# Patient Record
Sex: FEMALE | Race: WHITE | NOT HISPANIC OR LATINO | Employment: OTHER | URBAN - METROPOLITAN AREA
[De-identification: names, ages, dates, MRNs, and addresses within clinical notes are randomized per-mention and may not be internally consistent; named-entity substitution may affect disease eponyms.]

---

## 2025-02-17 ENCOUNTER — APPOINTMENT (OUTPATIENT)
Dept: RADIOLOGY | Facility: HOSPITAL | Age: 85
End: 2025-02-17
Payer: MEDICARE

## 2025-02-17 ENCOUNTER — HOSPITAL ENCOUNTER (OUTPATIENT)
Facility: HOSPITAL | Age: 85
Setting detail: OBSERVATION
Discharge: HOME | End: 2025-02-18
Attending: EMERGENCY MEDICINE | Admitting: INTERNAL MEDICINE
Payer: MEDICARE

## 2025-02-17 DIAGNOSIS — R79.1 SUPRATHERAPEUTIC INR: ICD-10-CM
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
## 2025-02-17 DIAGNOSIS — S22.060D CLOSED WEDGE COMPRESSION FRACTURE OF T8 VERTEBRA WITH ROUTINE HEALING: ICD-10-CM

## 2025-02-17 DIAGNOSIS — R91.1 LUNG NODULE: ICD-10-CM

## 2025-02-17 DIAGNOSIS — R26.2 DIFFICULTY IN WALKING: ICD-10-CM

## 2025-02-17 DIAGNOSIS — S22.060A COMPRESSION FRACTURE OF T8 VERTEBRA, INITIAL ENCOUNTER (MULTI): Primary | ICD-10-CM

## 2025-02-17 DIAGNOSIS — S09.90XA CLOSED HEAD INJURY, INITIAL ENCOUNTER: ICD-10-CM

## 2025-02-17 LAB
ABO GROUP (TYPE) IN BLOOD: NORMAL
ALBUMIN SERPL BCP-MCNC: 4.5 G/DL (ref 3.4–5)
ALP SERPL-CCNC: 73 U/L (ref 33–136)
ALT SERPL W P-5'-P-CCNC: 9 U/L (ref 7–45)
ANION GAP SERPL CALC-SCNC: 13 MMOL/L (ref 10–20)
ANTIBODY SCREEN: NORMAL
AST SERPL W P-5'-P-CCNC: 16 U/L (ref 9–39)
BASOPHILS # BLD AUTO: 0.04 X10*3/UL (ref 0–0.1)
BASOPHILS NFR BLD AUTO: 1.1 %
BILIRUB SERPL-MCNC: 1 MG/DL (ref 0–1.2)
BUN SERPL-MCNC: 14 MG/DL (ref 6–23)
CALCIUM SERPL-MCNC: 9.4 MG/DL (ref 8.6–10.3)
CHLORIDE SERPL-SCNC: 100 MMOL/L (ref 98–107)
CK SERPL-CCNC: 36 U/L (ref 0–215)
CO2 SERPL-SCNC: 29 MMOL/L (ref 21–32)
CREAT SERPL-MCNC: 0.49 MG/DL (ref 0.5–1.05)
EGFRCR SERPLBLD CKD-EPI 2021: >90 ML/MIN/1.73M*2
EOSINOPHIL # BLD AUTO: 0.03 X10*3/UL (ref 0–0.4)
EOSINOPHIL NFR BLD AUTO: 0.8 %
ERYTHROCYTE [DISTWIDTH] IN BLOOD BY AUTOMATED COUNT: 13.9 % (ref 11.5–14.5)
ETHANOL SERPL-MCNC: <10 MG/DL
GLUCOSE SERPL-MCNC: 138 MG/DL (ref 74–99)
HCT VFR BLD AUTO: 41.1 % (ref 36–46)
HGB BLD-MCNC: 13.7 G/DL (ref 12–16)
HOLD SPECIMEN: NORMAL
IMM GRANULOCYTES # BLD AUTO: 0.01 X10*3/UL (ref 0–0.5)
IMM GRANULOCYTES NFR BLD AUTO: 0.3 % (ref 0–0.9)
INR PPP: 5.3 (ref 0.9–1.1)
LACTATE SERPL-SCNC: 1.3 MMOL/L (ref 0.4–2)
LYMPHOCYTES # BLD AUTO: 0.58 X10*3/UL (ref 0.8–3)
LYMPHOCYTES NFR BLD AUTO: 15.7 %
MCH RBC QN AUTO: 33.1 PG (ref 26–34)
MCHC RBC AUTO-ENTMCNC: 33.3 G/DL (ref 32–36)
MCV RBC AUTO: 99 FL (ref 80–100)
MONOCYTES # BLD AUTO: 0.24 X10*3/UL (ref 0.05–0.8)
MONOCYTES NFR BLD AUTO: 6.5 %
NEUTROPHILS # BLD AUTO: 2.79 X10*3/UL (ref 1.6–5.5)
NEUTROPHILS NFR BLD AUTO: 75.6 %
NRBC BLD-RTO: 0 /100 WBCS (ref 0–0)
PLATELET # BLD AUTO: 69 X10*3/UL (ref 150–450)
POTASSIUM SERPL-SCNC: 3.9 MMOL/L (ref 3.5–5.3)
PROT SERPL-MCNC: 6.7 G/DL (ref 6.4–8.2)
PROTHROMBIN TIME: 61.4 SECONDS (ref 9.8–12.8)
RBC # BLD AUTO: 4.14 X10*6/UL (ref 4–5.2)
RH FACTOR (ANTIGEN D): NORMAL
SODIUM SERPL-SCNC: 138 MMOL/L (ref 136–145)
WBC # BLD AUTO: 3.7 X10*3/UL (ref 4.4–11.3)

## 2025-02-17 PROCEDURE — 71045 X-RAY EXAM CHEST 1 VIEW: CPT | Mod: FOREIGN READ | Performed by: RADIOLOGY

## 2025-02-17 PROCEDURE — 72128 CT CHEST SPINE W/O DYE: CPT | Mod: RCN

## 2025-02-17 PROCEDURE — 99285 EMERGENCY DEPT VISIT HI MDM: CPT | Mod: 25 | Performed by: EMERGENCY MEDICINE

## 2025-02-17 PROCEDURE — 99223 1ST HOSP IP/OBS HIGH 75: CPT | Performed by: INTERNAL MEDICINE

## 2025-02-17 PROCEDURE — 36415 COLL VENOUS BLD VENIPUNCTURE: CPT | Performed by: EMERGENCY MEDICINE

## 2025-02-17 PROCEDURE — 2500000001 HC RX 250 WO HCPCS SELF ADMINISTERED DRUGS (ALT 637 FOR MEDICARE OP): Performed by: EMERGENCY MEDICINE

## 2025-02-17 PROCEDURE — 72170 X-RAY EXAM OF PELVIS: CPT | Mod: FOREIGN READ | Performed by: RADIOLOGY

## 2025-02-17 PROCEDURE — 83605 ASSAY OF LACTIC ACID: CPT | Performed by: EMERGENCY MEDICINE

## 2025-02-17 PROCEDURE — 96375 TX/PRO/DX INJ NEW DRUG ADDON: CPT

## 2025-02-17 PROCEDURE — 85610 PROTHROMBIN TIME: CPT | Performed by: EMERGENCY MEDICINE

## 2025-02-17 PROCEDURE — G0390 TRAUMA RESPONS W/HOSP CRITI: HCPCS

## 2025-02-17 PROCEDURE — 70450 CT HEAD/BRAIN W/O DYE: CPT

## 2025-02-17 PROCEDURE — 85025 COMPLETE CBC W/AUTO DIFF WBC: CPT | Performed by: EMERGENCY MEDICINE

## 2025-02-17 PROCEDURE — 2500000004 HC RX 250 GENERAL PHARMACY W/ HCPCS (ALT 636 FOR OP/ED): Performed by: EMERGENCY MEDICINE

## 2025-02-17 PROCEDURE — 83036 HEMOGLOBIN GLYCOSYLATED A1C: CPT | Mod: ELYLAB | Performed by: INTERNAL MEDICINE

## 2025-02-17 PROCEDURE — 72170 X-RAY EXAM OF PELVIS: CPT

## 2025-02-17 PROCEDURE — 71045 X-RAY EXAM CHEST 1 VIEW: CPT

## 2025-02-17 PROCEDURE — 72131 CT LUMBAR SPINE W/O DYE: CPT | Mod: RCN

## 2025-02-17 PROCEDURE — 82077 ASSAY SPEC XCP UR&BREATH IA: CPT | Performed by: EMERGENCY MEDICINE

## 2025-02-17 PROCEDURE — 84075 ASSAY ALKALINE PHOSPHATASE: CPT | Performed by: EMERGENCY MEDICINE

## 2025-02-17 PROCEDURE — 82550 ASSAY OF CK (CPK): CPT | Performed by: EMERGENCY MEDICINE

## 2025-02-17 PROCEDURE — G0378 HOSPITAL OBSERVATION PER HR: HCPCS

## 2025-02-17 PROCEDURE — 72125 CT NECK SPINE W/O DYE: CPT

## 2025-02-17 PROCEDURE — 96374 THER/PROPH/DIAG INJ IV PUSH: CPT

## 2025-02-17 PROCEDURE — 71250 CT THORAX DX C-: CPT

## 2025-02-17 PROCEDURE — 86901 BLOOD TYPING SEROLOGIC RH(D): CPT | Performed by: EMERGENCY MEDICINE

## 2025-02-17 RX ORDER — ACETAMINOPHEN 500 MG
1000 TABLET ORAL EVERY 8 HOURS PRN
Qty: 30 TABLET | Refills: 0 | Status: SHIPPED | OUTPATIENT
Start: 2025-02-17 | End: 2025-02-18 | Stop reason: HOSPADM

## 2025-02-17 RX ORDER — LIDOCAINE 560 MG/1
1 PATCH PERCUTANEOUS; TOPICAL; TRANSDERMAL DAILY
Status: DISCONTINUED | OUTPATIENT
Start: 2025-02-18 | End: 2025-02-18 | Stop reason: HOSPADM

## 2025-02-17 RX ORDER — LIDOCAINE 50 MG/G
1 PATCH TOPICAL DAILY
Qty: 5 PATCH | Refills: 0 | Status: SHIPPED | OUTPATIENT
Start: 2025-02-17 | End: 2025-02-18

## 2025-02-17 RX ORDER — FENTANYL CITRATE 50 UG/ML
25 INJECTION, SOLUTION INTRAMUSCULAR; INTRAVENOUS ONCE
Status: COMPLETED | OUTPATIENT
Start: 2025-02-17 | End: 2025-02-17

## 2025-02-17 RX ORDER — ONDANSETRON HYDROCHLORIDE 2 MG/ML
4 INJECTION, SOLUTION INTRAVENOUS ONCE
Status: COMPLETED | OUTPATIENT
Start: 2025-02-17 | End: 2025-02-17

## 2025-02-17 RX ORDER — ACETAMINOPHEN 325 MG/1
1000 TABLET ORAL ONCE
Status: COMPLETED | OUTPATIENT
Start: 2025-02-17 | End: 2025-02-17

## 2025-02-17 RX ADMIN — ACETAMINOPHEN 975 MG: 325 TABLET ORAL at 21:44

## 2025-02-17 RX ADMIN — ONDANSETRON 4 MG: 2 INJECTION INTRAMUSCULAR; INTRAVENOUS at 19:49

## 2025-02-17 RX ADMIN — FENTANYL CITRATE 25 MCG: 50 INJECTION INTRAMUSCULAR; INTRAVENOUS at 19:50

## 2025-02-17 ASSESSMENT — LIFESTYLE VARIABLES
EVER HAD A DRINK FIRST THING IN THE MORNING TO STEADY YOUR NERVES TO GET RID OF A HANGOVER: NO
TOTAL SCORE: 0
EVER FELT BAD OR GUILTY ABOUT YOUR DRINKING: NO
HAVE YOU EVER FELT YOU SHOULD CUT DOWN ON YOUR DRINKING: NO
HAVE PEOPLE ANNOYED YOU BY CRITICIZING YOUR DRINKING: NO

## 2025-02-17 ASSESSMENT — PAIN - FUNCTIONAL ASSESSMENT: PAIN_FUNCTIONAL_ASSESSMENT: 0-10

## 2025-02-17 ASSESSMENT — PAIN SCALES - GENERAL: PAINLEVEL_OUTOF10: 9

## 2025-02-18 VITALS
BODY MASS INDEX: 19.42 KG/M2 | OXYGEN SATURATION: 92 % | DIASTOLIC BLOOD PRESSURE: 57 MMHG | WEIGHT: 113.76 LBS | RESPIRATION RATE: 18 BRPM | HEART RATE: 83 BPM | SYSTOLIC BLOOD PRESSURE: 110 MMHG | TEMPERATURE: 98.6 F | HEIGHT: 64 IN

## 2025-02-18 PROBLEM — R79.1 SUPRATHERAPEUTIC INR: Status: ACTIVE | Noted: 2025-02-18

## 2025-02-18 PROBLEM — S22.060D CLOSED WEDGE COMPRESSION FRACTURE OF T8 VERTEBRA WITH ROUTINE HEALING: Status: ACTIVE | Noted: 2025-02-18

## 2025-02-18 PROBLEM — R73.9 HYPERGLYCEMIA: Status: ACTIVE | Noted: 2025-02-18

## 2025-02-18 PROBLEM — J44.9 COPD (CHRONIC OBSTRUCTIVE PULMONARY DISEASE) (MULTI): Status: ACTIVE | Noted: 2025-02-18

## 2025-02-18 PROBLEM — F10.90 ALCOHOL USE DISORDER: Status: ACTIVE | Noted: 2025-02-18

## 2025-02-18 PROBLEM — I48.20 CHRONIC ATRIAL FIBRILLATION (MULTI): Status: ACTIVE | Noted: 2025-02-18

## 2025-02-18 PROBLEM — G31.84 MILD COGNITIVE IMPAIRMENT: Status: ACTIVE | Noted: 2025-02-18

## 2025-02-18 PROBLEM — D69.6 THROMBOCYTOPENIA (CMS-HCC): Status: ACTIVE | Noted: 2025-02-18

## 2025-02-18 LAB
ALBUMIN SERPL BCP-MCNC: 3.9 G/DL (ref 3.4–5)
ALP SERPL-CCNC: 63 U/L (ref 33–136)
ALT SERPL W P-5'-P-CCNC: 8 U/L (ref 7–45)
ANION GAP SERPL CALC-SCNC: 10 MMOL/L (ref 10–20)
APPEARANCE UR: CLEAR
AST SERPL W P-5'-P-CCNC: 14 U/L (ref 9–39)
BACTERIA #/AREA URNS AUTO: ABNORMAL /HPF
BASOPHILS # BLD AUTO: 0.02 X10*3/UL (ref 0–0.1)
BASOPHILS NFR BLD AUTO: 0.7 %
BILIRUB SERPL-MCNC: 0.9 MG/DL (ref 0–1.2)
BILIRUB UR STRIP.AUTO-MCNC: NEGATIVE MG/DL
BUN SERPL-MCNC: 13 MG/DL (ref 6–23)
CALCIUM SERPL-MCNC: 8.9 MG/DL (ref 8.6–10.3)
CHLORIDE SERPL-SCNC: 101 MMOL/L (ref 98–107)
CO2 SERPL-SCNC: 30 MMOL/L (ref 21–32)
COLOR UR: YELLOW
CREAT SERPL-MCNC: 0.38 MG/DL (ref 0.5–1.05)
EGFRCR SERPLBLD CKD-EPI 2021: >90 ML/MIN/1.73M*2
EOSINOPHIL # BLD AUTO: 0.05 X10*3/UL (ref 0–0.4)
EOSINOPHIL NFR BLD AUTO: 1.8 %
ERYTHROCYTE [DISTWIDTH] IN BLOOD BY AUTOMATED COUNT: 13.9 % (ref 11.5–14.5)
EST. AVERAGE GLUCOSE BLD GHB EST-MCNC: 108 MG/DL
GLUCOSE SERPL-MCNC: 90 MG/DL (ref 74–99)
GLUCOSE UR STRIP.AUTO-MCNC: NORMAL MG/DL
HBA1C MFR BLD: 5.4 %
HCT VFR BLD AUTO: 37.5 % (ref 36–46)
HGB BLD-MCNC: 12.4 G/DL (ref 12–16)
HOLD SPECIMEN: NORMAL
HOLD SPECIMEN: NORMAL
IMM GRANULOCYTES # BLD AUTO: 0.01 X10*3/UL (ref 0–0.5)
IMM GRANULOCYTES NFR BLD AUTO: 0.4 % (ref 0–0.9)
INR PPP: 4.7 (ref 0.9–1.1)
KETONES UR STRIP.AUTO-MCNC: NEGATIVE MG/DL
LEUKOCYTE ESTERASE UR QL STRIP.AUTO: NEGATIVE
LYMPHOCYTES # BLD AUTO: 0.72 X10*3/UL (ref 0.8–3)
LYMPHOCYTES NFR BLD AUTO: 25.8 %
MAGNESIUM SERPL-MCNC: 1.6 MG/DL (ref 1.6–2.4)
MCH RBC QN AUTO: 32.8 PG (ref 26–34)
MCHC RBC AUTO-ENTMCNC: 33.1 G/DL (ref 32–36)
MCV RBC AUTO: 99 FL (ref 80–100)
MONOCYTES # BLD AUTO: 0.27 X10*3/UL (ref 0.05–0.8)
MONOCYTES NFR BLD AUTO: 9.7 %
MUCOUS THREADS #/AREA URNS AUTO: ABNORMAL /LPF
NEUTROPHILS # BLD AUTO: 1.72 X10*3/UL (ref 1.6–5.5)
NEUTROPHILS NFR BLD AUTO: 61.6 %
NITRITE UR QL STRIP.AUTO: NEGATIVE
NRBC BLD-RTO: 0 /100 WBCS (ref 0–0)
PH UR STRIP.AUTO: 6.5 [PH]
PLATELET # BLD AUTO: 56 X10*3/UL (ref 150–450)
POTASSIUM SERPL-SCNC: 3.8 MMOL/L (ref 3.5–5.3)
PROT SERPL-MCNC: 5.7 G/DL (ref 6.4–8.2)
PROT UR STRIP.AUTO-MCNC: ABNORMAL MG/DL
PROTHROMBIN TIME: 54.4 SECONDS (ref 9.8–12.8)
RBC # BLD AUTO: 3.78 X10*6/UL (ref 4–5.2)
RBC # UR STRIP.AUTO: ABNORMAL MG/DL
RBC #/AREA URNS AUTO: ABNORMAL /HPF
SODIUM SERPL-SCNC: 137 MMOL/L (ref 136–145)
SP GR UR STRIP.AUTO: 1.02
UROBILINOGEN UR STRIP.AUTO-MCNC: NORMAL MG/DL
WBC # BLD AUTO: 2.8 X10*3/UL (ref 4.4–11.3)
WBC #/AREA URNS AUTO: ABNORMAL /HPF

## 2025-02-18 PROCEDURE — G0378 HOSPITAL OBSERVATION PER HR: HCPCS

## 2025-02-18 PROCEDURE — 85025 COMPLETE CBC W/AUTO DIFF WBC: CPT | Performed by: INTERNAL MEDICINE

## 2025-02-18 PROCEDURE — 97161 PT EVAL LOW COMPLEX 20 MIN: CPT | Mod: GP | Performed by: PHYSICAL THERAPIST

## 2025-02-18 PROCEDURE — 97165 OT EVAL LOW COMPLEX 30 MIN: CPT | Mod: GO

## 2025-02-18 PROCEDURE — 2500000004 HC RX 250 GENERAL PHARMACY W/ HCPCS (ALT 636 FOR OP/ED): Performed by: INTERNAL MEDICINE

## 2025-02-18 PROCEDURE — 2500000001 HC RX 250 WO HCPCS SELF ADMINISTERED DRUGS (ALT 637 FOR MEDICARE OP): Performed by: INTERNAL MEDICINE

## 2025-02-18 PROCEDURE — 99239 HOSP IP/OBS DSCHRG MGMT >30: CPT | Performed by: INTERNAL MEDICINE

## 2025-02-18 PROCEDURE — 36415 COLL VENOUS BLD VENIPUNCTURE: CPT | Performed by: INTERNAL MEDICINE

## 2025-02-18 PROCEDURE — 85610 PROTHROMBIN TIME: CPT | Performed by: INTERNAL MEDICINE

## 2025-02-18 PROCEDURE — 83735 ASSAY OF MAGNESIUM: CPT | Performed by: INTERNAL MEDICINE

## 2025-02-18 PROCEDURE — 81001 URINALYSIS AUTO W/SCOPE: CPT | Performed by: EMERGENCY MEDICINE

## 2025-02-18 PROCEDURE — 80053 COMPREHEN METABOLIC PANEL: CPT | Performed by: INTERNAL MEDICINE

## 2025-02-18 PROCEDURE — 2500000005 HC RX 250 GENERAL PHARMACY W/O HCPCS: Performed by: INTERNAL MEDICINE

## 2025-02-18 RX ORDER — DILTIAZEM HYDROCHLORIDE 180 MG/1
180 CAPSULE, EXTENDED RELEASE ORAL DAILY
COMMUNITY
Start: 2024-05-03

## 2025-02-18 RX ORDER — OXYCODONE HYDROCHLORIDE 5 MG/1
5 TABLET ORAL EVERY 6 HOURS PRN
Status: DISCONTINUED | OUTPATIENT
Start: 2025-02-18 | End: 2025-02-18 | Stop reason: HOSPADM

## 2025-02-18 RX ORDER — NITROFURANTOIN 25; 75 MG/1; MG/1
1 CAPSULE ORAL
Status: ON HOLD | COMMUNITY
Start: 2024-09-26 | End: 2025-02-18 | Stop reason: ALTCHOICE

## 2025-02-18 RX ORDER — FUROSEMIDE 20 MG/1
20 TABLET ORAL
COMMUNITY
Start: 2025-01-22

## 2025-02-18 RX ORDER — LORAZEPAM 1 MG/1
2 TABLET ORAL EVERY 2 HOUR PRN
Status: DISCONTINUED | OUTPATIENT
Start: 2025-02-18 | End: 2025-02-18 | Stop reason: HOSPADM

## 2025-02-18 RX ORDER — MULTIVIT-MIN/IRON FUM/FOLIC AC 7.5 MG-4
1 TABLET ORAL DAILY
Status: DISCONTINUED | OUTPATIENT
Start: 2025-02-18 | End: 2025-02-18 | Stop reason: HOSPADM

## 2025-02-18 RX ORDER — CITALOPRAM 20 MG/1
40 TABLET, FILM COATED ORAL NIGHTLY
COMMUNITY
Start: 2024-09-03

## 2025-02-18 RX ORDER — LANOLIN ALCOHOL/MO/W.PET/CERES
100 CREAM (GRAM) TOPICAL DAILY
Status: DISCONTINUED | OUTPATIENT
Start: 2025-02-18 | End: 2025-02-18 | Stop reason: HOSPADM

## 2025-02-18 RX ORDER — ONDANSETRON HYDROCHLORIDE 2 MG/ML
4 INJECTION, SOLUTION INTRAVENOUS EVERY 8 HOURS PRN
Status: DISCONTINUED | OUTPATIENT
Start: 2025-02-18 | End: 2025-02-18 | Stop reason: HOSPADM

## 2025-02-18 RX ORDER — WARFARIN 2.5 MG/1
TABLET ORAL
COMMUNITY
Start: 2024-12-28

## 2025-02-18 RX ORDER — LORAZEPAM 1 MG/1
1 TABLET ORAL EVERY 2 HOUR PRN
Status: DISCONTINUED | OUTPATIENT
Start: 2025-02-18 | End: 2025-02-18 | Stop reason: HOSPADM

## 2025-02-18 RX ORDER — ACETAMINOPHEN 160 MG/5ML
650 SOLUTION ORAL EVERY 4 HOURS PRN
Status: DISCONTINUED | OUTPATIENT
Start: 2025-02-18 | End: 2025-02-18 | Stop reason: HOSPADM

## 2025-02-18 RX ORDER — METOPROLOL TARTRATE 50 MG/1
50 TABLET ORAL 2 TIMES DAILY
Status: DISCONTINUED | OUTPATIENT
Start: 2025-02-18 | End: 2025-02-18 | Stop reason: HOSPADM

## 2025-02-18 RX ORDER — ACETAMINOPHEN 500 MG
1000 TABLET ORAL 2 TIMES DAILY
Status: ON HOLD | COMMUNITY
End: 2025-02-18

## 2025-02-18 RX ORDER — FOLIC ACID 1 MG/1
1 TABLET ORAL DAILY
Status: DISCONTINUED | OUTPATIENT
Start: 2025-02-18 | End: 2025-02-18 | Stop reason: HOSPADM

## 2025-02-18 RX ORDER — ACETAMINOPHEN 500 MG
1000 TABLET ORAL EVERY 6 HOURS PRN
Qty: 30 TABLET | Refills: 0 | Status: SHIPPED | OUTPATIENT
Start: 2025-02-18

## 2025-02-18 RX ORDER — OXYCODONE HYDROCHLORIDE 5 MG/1
5 TABLET ORAL EVERY 6 HOURS PRN
Qty: 15 TABLET | Refills: 0 | Status: SHIPPED | OUTPATIENT
Start: 2025-02-18

## 2025-02-18 RX ORDER — METOPROLOL TARTRATE 50 MG/1
50 TABLET ORAL 2 TIMES DAILY
COMMUNITY

## 2025-02-18 RX ORDER — POLYETHYLENE GLYCOL 3350 17 G/17G
17 POWDER, FOR SOLUTION ORAL DAILY PRN
Status: DISCONTINUED | OUTPATIENT
Start: 2025-02-18 | End: 2025-02-18 | Stop reason: HOSPADM

## 2025-02-18 RX ORDER — ACETAMINOPHEN 325 MG/1
650 TABLET ORAL EVERY 4 HOURS PRN
Status: DISCONTINUED | OUTPATIENT
Start: 2025-02-18 | End: 2025-02-18 | Stop reason: HOSPADM

## 2025-02-18 RX ORDER — ENALAPRIL MALEATE 5 MG/1
5 TABLET ORAL NIGHTLY
COMMUNITY

## 2025-02-18 RX ORDER — ACETAMINOPHEN 650 MG/1
650 SUPPOSITORY RECTAL EVERY 4 HOURS PRN
Status: DISCONTINUED | OUTPATIENT
Start: 2025-02-18 | End: 2025-02-18 | Stop reason: HOSPADM

## 2025-02-18 RX ORDER — IPRATROPIUM BROMIDE AND ALBUTEROL SULFATE 2.5; .5 MG/3ML; MG/3ML
3 SOLUTION RESPIRATORY (INHALATION) EVERY 4 HOURS PRN
Status: DISCONTINUED | OUTPATIENT
Start: 2025-02-18 | End: 2025-02-18 | Stop reason: HOSPADM

## 2025-02-18 RX ORDER — BUPROPION HYDROCHLORIDE 150 MG/1
150 TABLET ORAL
COMMUNITY
Start: 2024-09-03

## 2025-02-18 RX ORDER — ONDANSETRON 4 MG/1
4 TABLET, ORALLY DISINTEGRATING ORAL EVERY 8 HOURS PRN
Status: DISCONTINUED | OUTPATIENT
Start: 2025-02-18 | End: 2025-02-18 | Stop reason: HOSPADM

## 2025-02-18 RX ORDER — LIDOCAINE 50 MG/G
1 PATCH TOPICAL DAILY
Qty: 5 PATCH | Refills: 0 | Status: SHIPPED | OUTPATIENT
Start: 2025-02-18

## 2025-02-18 RX ORDER — DILTIAZEM HYDROCHLORIDE 180 MG/1
180 CAPSULE, COATED, EXTENDED RELEASE ORAL DAILY
Status: DISCONTINUED | OUTPATIENT
Start: 2025-02-18 | End: 2025-02-18 | Stop reason: HOSPADM

## 2025-02-18 RX ORDER — FOLIC ACID 1 MG/1
1 TABLET ORAL NIGHTLY
COMMUNITY

## 2025-02-18 RX ORDER — LORAZEPAM 0.5 MG/1
0.5 TABLET ORAL EVERY 2 HOUR PRN
Status: DISCONTINUED | OUTPATIENT
Start: 2025-02-18 | End: 2025-02-18 | Stop reason: HOSPADM

## 2025-02-18 RX ORDER — ALBUTEROL SULFATE 90 UG/1
2 INHALANT RESPIRATORY (INHALATION) EVERY 4 HOURS PRN
COMMUNITY
Start: 2024-05-03

## 2025-02-18 RX ADMIN — METOPROLOL TARTRATE 50 MG: 50 TABLET, FILM COATED ORAL at 09:52

## 2025-02-18 RX ADMIN — METOPROLOL TARTRATE 50 MG: 50 TABLET, FILM COATED ORAL at 02:56

## 2025-02-18 RX ADMIN — ONDANSETRON 4 MG: 4 TABLET, ORALLY DISINTEGRATING ORAL at 15:49

## 2025-02-18 RX ADMIN — LIDOCAINE 4% 1 PATCH: 40 PATCH TOPICAL at 09:52

## 2025-02-18 RX ADMIN — DILTIAZEM HYDROCHLORIDE 180 MG: 180 CAPSULE, COATED, EXTENDED RELEASE ORAL at 09:52

## 2025-02-18 RX ADMIN — OXYCODONE 5 MG: 5 TABLET ORAL at 09:52

## 2025-02-18 RX ADMIN — Medication 100 MG: at 02:56

## 2025-02-18 RX ADMIN — Medication 1 TABLET: at 09:52

## 2025-02-18 RX ADMIN — FOLIC ACID 1 MG: 1 TABLET ORAL at 09:52

## 2025-02-18 RX ADMIN — ACETAMINOPHEN 650 MG: 325 TABLET ORAL at 17:27

## 2025-02-18 SDOH — ECONOMIC STABILITY: INCOME INSECURITY: IN THE PAST 12 MONTHS HAS THE ELECTRIC, GAS, OIL, OR WATER COMPANY THREATENED TO SHUT OFF SERVICES IN YOUR HOME?: NO

## 2025-02-18 SDOH — ECONOMIC STABILITY: FOOD INSECURITY: HOW HARD IS IT FOR YOU TO PAY FOR THE VERY BASICS LIKE FOOD, HOUSING, MEDICAL CARE, AND HEATING?: SOMEWHAT HARD

## 2025-02-18 SDOH — SOCIAL STABILITY: SOCIAL INSECURITY
WITHIN THE LAST YEAR, HAVE YOU BEEN KICKED, HIT, SLAPPED, OR OTHERWISE PHYSICALLY HURT BY YOUR PARTNER OR EX-PARTNER?: NO

## 2025-02-18 SDOH — ECONOMIC STABILITY: TRANSPORTATION INSECURITY: IN THE PAST 12 MONTHS, HAS LACK OF TRANSPORTATION KEPT YOU FROM MEDICAL APPOINTMENTS OR FROM GETTING MEDICATIONS?: NO

## 2025-02-18 SDOH — ECONOMIC STABILITY: HOUSING INSECURITY: AT ANY TIME IN THE PAST 12 MONTHS, WERE YOU HOMELESS OR LIVING IN A SHELTER (INCLUDING NOW)?: NO

## 2025-02-18 SDOH — SOCIAL STABILITY: SOCIAL INSECURITY: WERE YOU ABLE TO COMPLETE ALL THE BEHAVIORAL HEALTH SCREENINGS?: YES

## 2025-02-18 SDOH — SOCIAL STABILITY: SOCIAL INSECURITY: WITHIN THE LAST YEAR, HAVE YOU BEEN HUMILIATED OR EMOTIONALLY ABUSED IN OTHER WAYS BY YOUR PARTNER OR EX-PARTNER?: NO

## 2025-02-18 SDOH — ECONOMIC STABILITY: FOOD INSECURITY: WITHIN THE PAST 12 MONTHS, THE FOOD YOU BOUGHT JUST DIDN'T LAST AND YOU DIDN'T HAVE MONEY TO GET MORE.: NEVER TRUE

## 2025-02-18 SDOH — ECONOMIC STABILITY: FOOD INSECURITY: WITHIN THE PAST 12 MONTHS, YOU WORRIED THAT YOUR FOOD WOULD RUN OUT BEFORE YOU GOT THE MONEY TO BUY MORE.: NEVER TRUE

## 2025-02-18 SDOH — SOCIAL STABILITY: SOCIAL INSECURITY: DO YOU FEEL ANYONE HAS EXPLOITED OR TAKEN ADVANTAGE OF YOU FINANCIALLY OR OF YOUR PERSONAL PROPERTY?: NO

## 2025-02-18 SDOH — SOCIAL STABILITY: SOCIAL INSECURITY: DO YOU FEEL UNSAFE GOING BACK TO THE PLACE WHERE YOU ARE LIVING?: NO

## 2025-02-18 SDOH — SOCIAL STABILITY: SOCIAL INSECURITY: ABUSE: ADULT

## 2025-02-18 SDOH — ECONOMIC STABILITY: HOUSING INSECURITY: IN THE LAST 12 MONTHS, WAS THERE A TIME WHEN YOU WERE NOT ABLE TO PAY THE MORTGAGE OR RENT ON TIME?: NO

## 2025-02-18 SDOH — SOCIAL STABILITY: SOCIAL INSECURITY: WITHIN THE LAST YEAR, HAVE YOU BEEN AFRAID OF YOUR PARTNER OR EX-PARTNER?: NO

## 2025-02-18 SDOH — SOCIAL STABILITY: SOCIAL INSECURITY: DOES ANYONE TRY TO KEEP YOU FROM HAVING/CONTACTING OTHER FRIENDS OR DOING THINGS OUTSIDE YOUR HOME?: NO

## 2025-02-18 SDOH — SOCIAL STABILITY: SOCIAL INSECURITY: HAVE YOU HAD ANY THOUGHTS OF HARMING ANYONE ELSE?: NO

## 2025-02-18 SDOH — SOCIAL STABILITY: SOCIAL INSECURITY
WITHIN THE LAST YEAR, HAVE YOU BEEN RAPED OR FORCED TO HAVE ANY KIND OF SEXUAL ACTIVITY BY YOUR PARTNER OR EX-PARTNER?: NO

## 2025-02-18 SDOH — ECONOMIC STABILITY: HOUSING INSECURITY: IN THE PAST 12 MONTHS, HOW MANY TIMES HAVE YOU MOVED WHERE YOU WERE LIVING?: 0

## 2025-02-18 SDOH — SOCIAL STABILITY: SOCIAL INSECURITY: ARE YOU OR HAVE YOU BEEN THREATENED OR ABUSED PHYSICALLY, EMOTIONALLY, OR SEXUALLY BY ANYONE?: NO

## 2025-02-18 SDOH — SOCIAL STABILITY: SOCIAL INSECURITY: HAS ANYONE EVER THREATENED TO HURT YOUR FAMILY OR YOUR PETS?: NO

## 2025-02-18 SDOH — SOCIAL STABILITY: SOCIAL INSECURITY: ARE THERE ANY APPARENT SIGNS OF INJURIES/BEHAVIORS THAT COULD BE RELATED TO ABUSE/NEGLECT?: NO

## 2025-02-18 SDOH — SOCIAL STABILITY: SOCIAL INSECURITY: HAVE YOU HAD THOUGHTS OF HARMING ANYONE ELSE?: NO

## 2025-02-18 ASSESSMENT — LIFESTYLE VARIABLES
TOTAL SCORE: 0
TOTAL SCORE: 0
PAROXYSMAL SWEATS: NO SWEAT VISIBLE
HEADACHE, FULLNESS IN HEAD: NOT PRESENT
NAUSEA AND VOMITING: NO NAUSEA AND NO VOMITING
ANXIETY: NO ANXIETY, AT EASE
VISUAL DISTURBANCES: NOT PRESENT
AGITATION: NORMAL ACTIVITY
VISUAL DISTURBANCES: NOT PRESENT
PAROXYSMAL SWEATS: NO SWEAT VISIBLE
AUDITORY DISTURBANCES: NOT PRESENT
VISUAL DISTURBANCES: NOT PRESENT
ORIENTATION AND CLOUDING OF SENSORIUM: ORIENTED AND CAN DO SERIAL ADDITIONS
ORIENTATION AND CLOUDING OF SENSORIUM: ORIENTED AND CAN DO SERIAL ADDITIONS
AGITATION: NORMAL ACTIVITY
BLOOD PRESSURE: 148/83
HEADACHE, FULLNESS IN HEAD: NOT PRESENT
NAUSEA AND VOMITING: NO NAUSEA AND NO VOMITING
BLOOD PRESSURE: 116/75
TOTAL SCORE: 0
AUDITORY DISTURBANCES: NOT PRESENT
TOTAL SCORE: 0
ANXIETY: NO ANXIETY, AT EASE
AUDITORY DISTURBANCES: NOT PRESENT
ORIENTATION AND CLOUDING OF SENSORIUM: ORIENTED AND CAN DO SERIAL ADDITIONS
HEADACHE, FULLNESS IN HEAD: NOT PRESENT
ANXIETY: NO ANXIETY, AT EASE
SKIP TO QUESTIONS 9-10: 1
PAROXYSMAL SWEATS: NO SWEAT VISIBLE
AUDITORY DISTURBANCES: NOT PRESENT
TREMOR: NO TREMOR
PULSE: 92
ORIENTATION AND CLOUDING OF SENSORIUM: ORIENTED AND CAN DO SERIAL ADDITIONS
PAROXYSMAL SWEATS: NO SWEAT VISIBLE
AUDIT-C TOTAL SCORE: 4
HOW OFTEN DO YOU HAVE 6 OR MORE DRINKS ON ONE OCCASION: NEVER
BLOOD PRESSURE: 127/80
NAUSEA AND VOMITING: NO NAUSEA AND NO VOMITING
TOTAL SCORE: 0
VISUAL DISTURBANCES: NOT PRESENT
HEADACHE, FULLNESS IN HEAD: NOT PRESENT
TOTAL SCORE: 0
PULSE: 88
NAUSEA AND VOMITING: NO NAUSEA AND NO VOMITING
TREMOR: NO TREMOR
AUDITORY DISTURBANCES: NOT PRESENT
HOW MANY STANDARD DRINKS CONTAINING ALCOHOL DO YOU HAVE ON A TYPICAL DAY: 1 OR 2
VISUAL DISTURBANCES: NOT PRESENT
HOW OFTEN DO YOU HAVE A DRINK CONTAINING ALCOHOL: 4 OR MORE TIMES A WEEK
NAUSEA AND VOMITING: NO NAUSEA AND NO VOMITING
PULSE: 74
AGITATION: NORMAL ACTIVITY
VISUAL DISTURBANCES: NOT PRESENT
PAROXYSMAL SWEATS: NO SWEAT VISIBLE
AUDIT-C TOTAL SCORE: 4
HEADACHE, FULLNESS IN HEAD: NOT PRESENT
TREMOR: NO TREMOR
TOTAL SCORE: 0
AGITATION: NORMAL ACTIVITY
VISUAL DISTURBANCES: NOT PRESENT
ANXIETY: NO ANXIETY, AT EASE
AUDITORY DISTURBANCES: NOT PRESENT
ANXIETY: NO ANXIETY, AT EASE
TREMOR: NO TREMOR
NAUSEA AND VOMITING: NO NAUSEA AND NO VOMITING
AUDITORY DISTURBANCES: NOT PRESENT
ORIENTATION AND CLOUDING OF SENSORIUM: ORIENTED AND CAN DO SERIAL ADDITIONS
NAUSEA AND VOMITING: NO NAUSEA AND NO VOMITING
AGITATION: NORMAL ACTIVITY
AGITATION: NORMAL ACTIVITY
TREMOR: NO TREMOR
ORIENTATION AND CLOUDING OF SENSORIUM: ORIENTED AND CAN DO SERIAL ADDITIONS
PAROXYSMAL SWEATS: NO SWEAT VISIBLE
ANXIETY: NO ANXIETY, AT EASE
TREMOR: NO TREMOR
HEADACHE, FULLNESS IN HEAD: NOT PRESENT
ORIENTATION AND CLOUDING OF SENSORIUM: ORIENTED AND CAN DO SERIAL ADDITIONS
TREMOR: NO TREMOR
HEADACHE, FULLNESS IN HEAD: NOT PRESENT
PULSE: 92
ANXIETY: NO ANXIETY, AT EASE
PAROXYSMAL SWEATS: NO SWEAT VISIBLE
PULSE: 99
AGITATION: NORMAL ACTIVITY

## 2025-02-18 ASSESSMENT — COGNITIVE AND FUNCTIONAL STATUS - GENERAL
WALKING IN HOSPITAL ROOM: A LITTLE
DRESSING REGULAR LOWER BODY CLOTHING: A LITTLE
HELP NEEDED FOR BATHING: A LITTLE
MOBILITY SCORE: 19
PERSONAL GROOMING: A LITTLE
MOBILITY SCORE: 19
MOBILITY SCORE: 18
STANDING UP FROM CHAIR USING ARMS: A LITTLE
MOVING FROM LYING ON BACK TO SITTING ON SIDE OF FLAT BED WITH BEDRAILS: A LITTLE
CLIMB 3 TO 5 STEPS WITH RAILING: A LOT
STANDING UP FROM CHAIR USING ARMS: A LITTLE
PATIENT BASELINE BEDBOUND: NO
HELP NEEDED FOR BATHING: A LITTLE
CLIMB 3 TO 5 STEPS WITH RAILING: A LOT
CLIMB 3 TO 5 STEPS WITH RAILING: A LITTLE
STANDING UP FROM CHAIR USING ARMS: A LITTLE
TOILETING: A LITTLE
MOVING TO AND FROM BED TO CHAIR: A LITTLE
HELP NEEDED FOR BATHING: A LITTLE
WALKING IN HOSPITAL ROOM: A LITTLE
DAILY ACTIVITIY SCORE: 22
DAILY ACTIVITIY SCORE: 22
DRESSING REGULAR UPPER BODY CLOTHING: A LITTLE
MOVING TO AND FROM BED TO CHAIR: A LITTLE
TURNING FROM BACK TO SIDE WHILE IN FLAT BAD: A LITTLE
MOVING TO AND FROM BED TO CHAIR: A LITTLE
DRESSING REGULAR LOWER BODY CLOTHING: A LITTLE
DAILY ACTIVITIY SCORE: 19
DRESSING REGULAR LOWER BODY CLOTHING: A LITTLE
WALKING IN HOSPITAL ROOM: A LITTLE

## 2025-02-18 ASSESSMENT — PAIN SCALES - GENERAL
PAINLEVEL_OUTOF10: 9
PAINLEVEL_OUTOF10: 6
PAINLEVEL_OUTOF10: 3
PAINLEVEL_OUTOF10: 3
PAINLEVEL_OUTOF10: 9
PAINLEVEL_OUTOF10: 0 - NO PAIN
PAINLEVEL_OUTOF10: 3

## 2025-02-18 ASSESSMENT — ACTIVITIES OF DAILY LIVING (ADL)
HEARING - RIGHT EAR: FUNCTIONAL
ADEQUATE_TO_COMPLETE_ADL: YES
WALKS IN HOME: NEEDS ASSISTANCE
DRESSING YOURSELF: INDEPENDENT
BATHING: INDEPENDENT
TOILETING: INDEPENDENT
JUDGMENT_ADEQUATE_SAFELY_COMPLETE_DAILY_ACTIVITIES: YES
PATIENT'S MEMORY ADEQUATE TO SAFELY COMPLETE DAILY ACTIVITIES?: YES
BATHING_ASSISTANCE: MINIMAL
GROOMING: INDEPENDENT
HEARING - LEFT EAR: FUNCTIONAL
LACK_OF_TRANSPORTATION: NO
FEEDING YOURSELF: INDEPENDENT

## 2025-02-18 ASSESSMENT — PATIENT HEALTH QUESTIONNAIRE - PHQ9
2. FEELING DOWN, DEPRESSED OR HOPELESS: NOT AT ALL
SUM OF ALL RESPONSES TO PHQ9 QUESTIONS 1 & 2: 0
1. LITTLE INTEREST OR PLEASURE IN DOING THINGS: NOT AT ALL

## 2025-02-18 ASSESSMENT — ENCOUNTER SYMPTOMS
DYSPHORIC MOOD: 0
DYSURIA: 0
HEMATURIA: 0
SHORTNESS OF BREATH: 0
COUGH: 0
NERVOUS/ANXIOUS: 0
DIARRHEA: 0
VOMITING: 0
EYE PAIN: 0
NAUSEA: 0
HEADACHES: 0
CHILLS: 0
WOUND: 0
DIZZINESS: 0
SORE THROAT: 0
ABDOMINAL PAIN: 0
FEVER: 0
ARTHRALGIAS: 1
PALPITATIONS: 0
BACK PAIN: 1

## 2025-02-18 ASSESSMENT — PAIN - FUNCTIONAL ASSESSMENT
PAIN_FUNCTIONAL_ASSESSMENT: 0-10

## 2025-02-18 ASSESSMENT — PAIN DESCRIPTION - LOCATION: LOCATION: BACK

## 2025-02-18 ASSESSMENT — COLUMBIA-SUICIDE SEVERITY RATING SCALE - C-SSRS
2. HAVE YOU ACTUALLY HAD ANY THOUGHTS OF KILLING YOURSELF?: NO
1. IN THE PAST MONTH, HAVE YOU WISHED YOU WERE DEAD OR WISHED YOU COULD GO TO SLEEP AND NOT WAKE UP?: NO
6. HAVE YOU EVER DONE ANYTHING, STARTED TO DO ANYTHING, OR PREPARED TO DO ANYTHING TO END YOUR LIFE?: NO

## 2025-02-18 NOTE — CARE PLAN
The patient's goals for the shift include      The clinical goals for the shift include Patient to remain comfortable; free of pain and injury throughout the shift.    Over the shift, the patient states that she is currently free of pain and will call for assistance when needed.

## 2025-02-18 NOTE — NURSING NOTE
AVS printed and reviewed with patient. All belonging sent with patient. New medication discussed with patient.  Understands the need for INR and F/U. Ride was called , message left. Waiting for return call.

## 2025-02-18 NOTE — PROGRESS NOTES
Klaudia Sen is a 84 y.o. female on day 0 of admission presenting with Fall, initial encounter.      Subjective   No overnight events. Resting in bed. No pain while at rest but having a fair amount of back pain with movement. No LE weakness or sensory changes. No chest pain or dyspnea.        Objective     Last Recorded Vitals  /75   Pulse 88   Temp 37 °C (98.6 °F) (Temporal)   Resp 18   Wt 51.6 kg (113 lb 12.1 oz)   SpO2 92%   Intake/Output last 3 Shifts:  No intake or output data in the 24 hours ending 02/18/25 1042    Admission Weight  Weight: 53.5 kg (118 lb) (02/17/25 1915)    Daily Weight  02/18/25 : 51.6 kg (113 lb 12.1 oz)    Image Results  XR pelvis 1-2 views  Narrative: STUDY:  Pelvis Radiographs; 2/17/2025 7:44 PM.  INDICATION:  Trauma, fall.  COMPARISON:  None.  ACCESSION NUMBER(S):  [id number]  ORDERING CLINICIAN:  OMERO PERDOMO  TECHNIQUE:  1 view(s) of the pelvis.  FINDINGS:    The pelvic ring is intact.  There is no acute fracture.  There are  surgical coils in the midline lower pelvis and mild osteoarthritis in  the right hip.  There is much more significant arthritic change in the  left hip with subchondral sclerosis and cyst formation in the femoral  head and adjacent superior acetabulum but no definite acute fracture  is visible.  Vascular calcifications are noted in both inguinal areas  but no additional soft tissue abnormalities are visible.  Impression: There is significant arthritic change in the left hip but no definite  acute bony abnormalities are seen in this AP view of the pelvis..  Signed by Daquan Molina MD  XR chest 1 view  Narrative: STUDY:  Chest Radiograph;  2/17/2025 7:44 PM.  INDICATION:  Trauma.  COMPARISON:  None  ACCESSION NUMBER(S):  [id number]  ORDERING CLINICIAN:  OMERO PERDOMO  TECHNIQUE:  Frontal chest was obtained at 19:43 hours.  FINDINGS:  CARDIOMEDIASTINAL SILHOUETTE:  The heart is enlarged and there is a left-sided cardiac pacer but no  definite  vascular congestion or edema is appreciated..     LUNGS:  Lungs are clear.  There is no visible consolidation, effusion or  pneumothorax.     ABDOMEN:  No remarkable upper abdominal findings.     BONES:  No acute osseous changes.  Impression: There is cardiomegaly and a left-sided cardiac pacer but the lungs  appear clear with no edema or consolidation..  Signed by Daquan Molina MD  CT thoracic spine wo IV contrast, CT lumbar spine wo IV contrast, CT chest abdomen pelvis wo IV contrast  Narrative: Interpreted By:  Gianni Barajas,   STUDY:  CT CHEST ABDOMEN PELVIS WO CONTRAST; CT THORACIC SPINE WO IV  CONTRAST; CT LUMBAR SPINE WO IV CONTRAST;  2/17/2025 7:54 pm;  2/17/2025 8:00 pm      INDICATION:  Signs/Symptoms:Trauma; Signs/Symptoms:fall, back pain.      COMPARISON:  None.      ACCESSION NUMBER(S):  [id number]; [id number]; [id number]      ORDERING CLINICIAN:  OMERO PERDOMO      TECHNIQUE:  Contiguous axial images of the chest, abdomen and pelvis were  obtained without intravenous contrast. Coronal and sagittal  reformatted images were obtained from the axial images. Reformatted  images of the thoracic and lumbar spine were also performed.      FINDINGS:  The examination is limited secondary to lack of intravenous contrast,  patient motion, and beam hardening artifact secondary to inferior  position of the patient's arms.      CT CHEST:      No axillary or mediastinal lymphadenopathy. There is limited  evaluation for hilar lymphadenopathy on noncontrast examination.      There is cardiomegaly. Coronary artery atherosclerotic  calcifications. No significant pericardial effusion. There is mild  aneurysmal dilatation of the ascending aorta measuring 4.3 cm in  diameter.      Trace right pleural effusion and pleural thickening and right basilar  subsegmental atelectasis. 6 mm oblong nodular opacity in the right  lower lobe at the lung base. No pneumothorax.      CT ABDOMEN PELVIS:      Limited evaluation for  liver mass or laceration on noncontrast  examination. The gallbladder is present. No dilatation of common bile  duct.      The pancreas and adrenal glands appear unremarkable.      Splenomegaly.      No evidence of renal calculus or hydronephrosis.      Atherosclerotic calcification of the abdominal aorta and iliac  arteries.      There is hernia in the left anterolateral pelvic wall with herniation  of segment of distal descending/proximal sigmoid colon. There is  colonic diverticulosis without evidence of acute diverticulitis. No  evidence of bowel obstruction.      Urinary bladder is underdistended and not well evaluated.      Advanced left hip osteoarthrosis.      CT THORACIC AND LUMBAR SPINE:      There is fracture compression deformity of T8 with process in the 50%  vertebral body height loss centrally. There is osteopenia and  multilevel degenerative change of the thoracic spine. There is  limited evaluation of the soft tissues of the spinal canal.      No acute fracture of the lumbar spine. There is multilevel  degenerative change of the lumbar spine. There is grade 2  anterolisthesis of L4 on L5. There is limited evaluation of the soft  tissues of the spinal canal. There is degenerative spinal canal  stenosis at L2-L3 L3-L4 and L4-L5.      Impression: Fracture compression deformity of T8 with approximately 50% vertebral  body height loss centrally; please correlate with point tenderness to  assess acuity.      Trace right pleural effusion and pleural thickening and right basilar  atelectasis.      6 mm oblong nodular opacity in the right lower lobe; follow-up CT  chest recommended in 6 to 12 months to assess stability.      Splenomegaly.      Hernia in the left anterior lateral pelvis wall with herniation of  segment of colon. No evidence of bowel obstruction. Colonic  diverticulosis without evidence of acute diverticulitis.      MACRO:  None      Signed by: Gianni Barajas 2/17/2025 8:18 PM  Dictation  workstation:   [id number]  CT cervical spine wo IV contrast  Narrative: Interpreted By:  Gianni Barajas,   STUDY:  CT CERVICAL SPINE WO IV CONTRAST;  2/17/2025 7:51 pm      INDICATION:  Signs/Symptoms:fall back pain.      COMPARISON:  None.      ACCESSION NUMBER(S):  [id number]      ORDERING CLINICIAN:  OMERO PERDOMO      TECHNIQUE:  Contiguous axial images of the cervical spine were obtained without  intravenous contrast. Coronal and sagittal reformatted images were  obtained from the axial images.      FINDINGS:  The examination is limited secondary to patient motion. No acute  fracture of the cervical spine. There is multilevel degenerative  change of the cervical spine. There is grade 1 anterolisthesis of C3  on C4, C4 on C5, and C5 on C6. There is multilevel intervertebral  disc space narrowing and degenerative disc disease. There is  multilevel anterior osseous spurring. There is limited evaluation of  the soft tissues of the spinal canal. There is multilevel  degenerative facet and uncovertebral arthropathy. No significant  prevertebral soft tissue edema.      Impression: No evidence of acute fracture of the cervical spine.      Multilevel degenerative change of the cervical spine.      MACRO:  None      Signed by: Gianni Barajas 2/17/2025 7:58 PM  Dictation workstation:   [id number]  CT head W O contrast trauma protocol  Narrative: Interpreted By:  Gianni Barajas,   STUDY:  CT HEAD W/O CONTRAST TRAUMA PROTOCOL;  2/17/2025 7:51 pm      INDICATION:  Trauma. Signs/Symptoms:closed head injury, fall.      COMPARISON:  None.      ACCESSION NUMBER(S):  [id number]      ORDERING CLINICIAN:  OMERO PERDOMO      TECHNIQUE:  Contiguous axial images of the head were obtained without intravenous  contrast. Coronal and sagittal reformatted images were obtained from  the axial images.      FINDINGS:  BRAIN PARENCHYMA: There is cerebral atrophy and chronic  periventricular white matter small vessel ischemic change. The  gray  white matter differentiation is preserved.  No mass effect or midline  shift.      HEMORRHAGE: No evidence of acute intracranial hemorrhage.  VENTRICLES AND EXTRA-AXIAL SPACES: The ventricles are within normal  limits in size for brain volume.  No evidence of abnormal extraaxial  fluid collection. PARANASAL SINUSES AND MASTOIDS: Mucosal thickening  of bilateral maxillary sinuses. CALVARIUM: No evidence of depressed  calvarial fracture.      EXTRACRANIAL SOFT TISSUES: Within normal limits.      OTHER FINDINGS: None          Brain Injury (BIG) guidelines CT values:      Skull fracture: No  SDH (subdural hematoma): None detected  EDH (epidural hematoma): None detected  IPH (intraparenchymal hemorrhage): None detected  SAH (subarachnoid hemorrhage): None detected  IVH (intraventricular hemorrhage): No      Reference:  Daquan ARELLANO, Bala RS, Dalton M, et al. The BIG (brain injury  guidelines) project: defining the management of traumatic brain  injury by acute care surgeons. J Trauma Acute Care Surg.  2014;76:165s414.      Impression: No acute intracranial hemorrhage or depressed calvarial fracture      Cerebral atrophy and chronic periventricular white matter small  vessel ischemic change.      Mucosal thickening of bilateral maxillary sinuses.      MACRO:  None      Signed by: Gianni Barajas 2/17/2025 7:56 PM  Dictation workstation:   [id number]      Physical Exam    Relevant Results               Assessment/Plan        Assessment & Plan  Fall, initial encounter    Closed wedge compression fracture of T8 vertebra with routine healing    Chronic atrial fibrillation (Multi)    Supratherapeutic INR    Alcohol use disorder    Mild cognitive impairment    COPD (chronic obstructive pulmonary disease) (Multi)    Thrombocytopenia (CMS-[facility])    Hyperglycemia    Accidental Fall  T8 Compression Fracture  Difficulty Ambulating  -Consult physical and Occupational Therapy for evaluation and treatment  -Patient may benefit from  more advanced imaging of compression fracture if able to obtain more information regarding pacemaker  -Pain control with Tylenol and oxycodone  - for discharge planning     Atrial Fibrillation  Supratherapeutic INR  -No evidence of bleeding complications from falls to this point  -Will hold warfarin but no urgent indication for reversal at this time  -Monitor on telemetry for RVR  -Reconcile and resume home medications.  Patient appears based on pharmacy records to be on metoprolol 50 mg twice daily and diltiazem  mg daily at home.     Alcohol Use Disorder  Mild Cognitive Impairment  -Delirium minimization measures while hospitalized  -Patient is not showing any signs of alcohol withdrawal currently but will order CIWA as a precaution, especially in light of markers of advanced hepatic disease     COPD  -Patient does not have any prescriptions for maintenance inhalers  -Will make DuoNebs available as needed while hospitalized     Thrombocytopenia  -Platelet count low on presentation at 69.  Able to review records from [facility] where patient had similar platelet counts, typically ranging 47-51 in 2024  -Monitor for evidence of bleeding as noted  -Patient has splenomegaly on CT scan     Hyperglycemia  -Check A1c to ensure no evidence of diabetes       2/18/25:  Pain adequately controlled at rest  No concern for fall mechanism at this point  Needs PT/OT eval to determine dispo - she lives alone  Home with [facility] vs SNF pending this  Cont symptomatic treatment for compression fracture  INR coming down, no evidence of ongoing bleeding; cont to hold warfarin until INR back in range  Follow up CT chest as outpatient for lung nodules       Valerio Granados MD

## 2025-02-18 NOTE — PROGRESS NOTES
Emergency Medicine Transition of Care Note.    I received Klaudia Sen in signout from Dr. Small.  Please see the previous ED provider note for all HPI, PE and MDM up to the time of signout at 2130. This is in addition to the primary record. In brief Klaudia Sen is an 84 y.o. female presenting for   Chief Complaint   Patient presents with    Trauma    Fall     Pt came in via squad. Pt states she tripped over her rollaider this morning at 10:0 and fell and hit her head. Pt states she wasn't feeling pain until around 1830 and called for a ambulance.     .  At the time of signout we were awaiting: Admit to obs      Diagnoses as of 02/17/25 2205   Fall, initial encounter   Closed head injury, initial encounter   Compression fracture of T8 vertebra, initial encounter (Multi)   Lung nodule   Difficulty in walking   Supratherapeutic INR       Medical Decision Making  Pt is an 83 y/o F w/ a PMHx of Afib on Coumadin who presents s/p mechanical fall aftet her Rolator slipt out from under her. She denied chest pain, shortness of breath or syncopal/pre-syncopal symptoms prior. Exam with a well appearing elderly female in NAD. Cardiac: irregularly irregular with normal rate no m/g/r; Lungs - CTAB no w/r/r, abdomen - soft, non-tender, and soft; neuro A&Ox4 without no focal deficits; Extremities, normal limb length and no hip tenderness. Labs significant for Supra therapeutic INR in 5s and scan only remarkable for T8 compression Fracture no ICH. Patient could not ambulate and wanted to leave but has no way to get home. Will admit under observation for PT eval and Coumadin dosing.          Final diagnoses:   [W19.XXXA] Fall, initial encounter   [S09.90XA] Closed head injury, initial encounter   [S22.060A] Compression fracture of T8 vertebra, initial encounter (Multi)   [R91.1] Lung nodule   [R26.2] Difficulty in walking   [R79.1] Supratherapeutic INR         Procedure  Procedures        Salvador Cuevas MD  MSBS-BS; MD

## 2025-02-18 NOTE — PROGRESS NOTES
02/18/25 1259   Discharge Planning   Living Arrangements Alone   Support Systems Friends/neighbors   Assistance Needed Per patient report independent in mobilty and ADLs with rollator. Has caregiver 1x/week to assist with cleaning, laundry and transportation. Reports 1 fall. Does not drive. has rollator w./ a faulty brake and a cane. pt has a private pay hha to assit w. iadls/transport.   Type of Residence Private residence   Number of Stairs to Enter Residence 0   Number of Stairs Within Residence 0   Who is requesting discharge planning? Provider   Home or Post Acute Services In home services  (pt declines)   Type of Home Care Services Home nursing visits;Home OT;Home PT   Expected Discharge Disposition Home H  (pt declines hhc and [facility])   Does the patient need discharge transport arranged? Yes  (possibly)   Transportation Needs   In the past 12 months, has lack of transportation kept you from medical appointments or from getting medications? no   In the past 12 months, has lack of transportation kept you from meetings, work, or from getting things needed for daily living? No   Stroke Family Assessment   Stroke Family Assessment Needed No   Intensity of Service   Intensity of Service 0-30 min     Pt 's demo' s and pcp were updated. Pt has a private pay caregiver/hha to assist her as needed. Pt recently had hhc. Discussed services of hhc- pt declines. Pt will need a ww and requested one.   2:25pm  signed prescription for WW given to physical therchristo vasquez PT to issue to pt this afternoon. Pt will be discharged today.

## 2025-02-18 NOTE — ED PROVIDER NOTES
84-year-old female presents emergency department activated as an HIA.  Patient states she was ambulating using her walker and she believes that the back break was not working properly.  She states that the walker got away from her and she lost her balance causing her to fall backward.  She reports she is having diffuse back pain.  She states that the fall took her breath away.  She does states she hit her head, but denies any headache.  She denies any loss of consciousness.  No chest pain or significant difficulty breathing.  No abdominal pain or vomiting.  She does admit to nausea.  No dysuria, diarrhea, constipation, black or bloody stools.  Patient is on Coumadin. Patient reports that she fell around 10 AM this morning, but was unable to get up to get an ambulance until 1830. Chart review shows significant past medical history of alcohol use, COPD, atrial fibrillation with RVR, DVT, CHF, frequent falls, depression, mild cognitive impairment, sick sinus syndrome, and thrombocytopenia.      History provided by:  Patient   used: No           ------------------------------------------------------------------------------------------------------------------------------------------    VS: As documented in the triage note and EMR flowsheet from this visit were reviewed.    Review of Systems  Constitutional: no fever, chills  Eyes: no redness, discharge, pain  HENT: no sore throat, nose bleeds, congestion, rhinorrhea   Cardiovascular: no chest pain, leg edema, palpitations  Respiratory: no shortness of breath, cough, wheezing  GI: Reports nausea no diarrhea, pain, vomiting, constipation, BRBPR, melena  : no dysuria, frequency, hematuria  Musculoskeletal: no neck pain, stiffness,  no joint deformity, swelling reports diffuse back pain  Skin: no rash, erythema, wounds  Neurological: no headache, dizziness, lightheadedness, weakness, numbness, or tingling  Psychiatric: no suicidal thoughts, confusion,  agitation  Metabolic: no polyuria or polydipsia  Hematologic: Patient is on Coumadin  Immunology: No immunocompromise state    [facility]  Nursing notes reviewed and confirmed by me.  Chart review performed including medications, allergies, and medical, surgical, and family history  Visit Vitals  /71   Pulse 75   Temp 36 °C (96.8 °F)   Resp 18     Physical Exam  Vitals and nursing note reviewed.   Constitutional:       General: She is not in acute distress.     Appearance: Normal appearance. She is not ill-appearing.   HENT:      Head: Normocephalic and atraumatic.      Comments: No Luis sign or raccoon eyes.  Midface stable.     Right Ear: External ear normal.      Left Ear: External ear normal.      Nose: Nose normal. No congestion or rhinorrhea.      Mouth/Throat:      Mouth: Mucous membranes are moist.      Pharynx: No oropharyngeal exudate or posterior oropharyngeal erythema.   Eyes:      Extraocular Movements: Extraocular movements intact.      Conjunctiva/sclera: Conjunctivae normal.      Pupils: Pupils are equal, round, and reactive to light.   Neck:      Comments: C-collar applied on patient's arrival.  Cardiovascular:      Rate and Rhythm: Normal rate and regular rhythm.      Pulses: Normal pulses.      Heart sounds: Normal heart sounds.   Pulmonary:      Effort: Pulmonary effort is normal. No respiratory distress.      Breath sounds: Normal breath sounds. No stridor. No wheezing, rhonchi or rales.   Abdominal:      General: There is no distension.      Palpations: Abdomen is soft.      Tenderness: There is no abdominal tenderness. There is no guarding or rebound.   Musculoskeletal:         General: No swelling or deformity.      Cervical back: Neck supple.      Right lower leg: No edema.      Left lower leg: No edema.      Comments: No calf tenderness   Diffuse midline back tenderness no step-offs or deformities.  Pelvis is stable.   Skin:     General: Skin is warm and dry.      Capillary Refill:  Capillary refill takes less than 2 seconds.      Coloration: Skin is not jaundiced.      Findings: No rash.   Neurological:      General: No focal deficit present.      Mental Status: She is alert and oriented to person, place, and time.      Sensory: No sensory deficit.      Motor: Weakness (Generalized) present.      Comments: Cranial nerves II through XII grossly intact.  Patient is generally weak.   Psychiatric:         Mood and Affect: Mood normal.         Behavior: Behavior normal.        No past medical history on file.   No past surgical history on file.   Social History     Socioeconomic History    Marital status:      Social Drivers of Health     Financial Resource Strain: Medium Risk (12/29/2024)    Received from [facility]    Overall Financial Resource Strain (CARDIA)     Difficulty of Paying Living Expenses: Somewhat hard   Food Insecurity: No Food Insecurity (12/27/2024)    Received from [facility]    Hunger Vital Sign     Worried About Running Out of Food in the Last Year: Never true     Ran Out of Food in the Last Year: Never true   Transportation Needs: No Transportation Needs (12/29/2024)    Received from [facility]    PRAPARE - Transportation     Lack of Transportation (Medical): No     Lack of Transportation (Non-Medical): No   Physical Activity: Inactive (6/24/2024)    Received from [facility]    Exercise Vital Sign     Days of Exercise per Week: 0 days     Minutes of Exercise per Session: 0 min   Stress: Stress Concern Present (1/5/2022)    Received from [facility]    [employer] [place] [employer] - Occupational Stress Questionnaire     Feeling of Stress : Rather much   Social Connections: Moderately Isolated (1/5/2022)    Received from [facility]    Social Connection and Isolation Panel [NHANES]     Frequency of Communication with Friends and Family: More than three times a week      Frequency of Social Gatherings with Friends and Family: Patient declined     Attends Restoration Services: More than 4 times per year     Active Member of Clubs or Organizations: No     Attends Club or Organization Meetings: Never     Marital Status:    Housing Stability: Unknown (12/27/2024)    Received from [facility]    Housing Stability Vital Sign     Unable to Pay for Housing in the Last Year: No     Homeless in the Last Year: No      ------------------------------------------------------------------------------------------------------------------------------------------  CT thoracic spine wo IV contrast   Final Result   Fracture compression deformity of T8 with approximately 50% vertebral   body height loss centrally; please correlate with point tenderness to   assess acuity.        Trace right pleural effusion and pleural thickening and right basilar   atelectasis.        6 mm oblong nodular opacity in the right lower lobe; follow-up CT   chest recommended in 6 to 12 months to assess stability.        Splenomegaly.        Hernia in the left anterior lateral pelvis wall with herniation of   segment of colon. No evidence of bowel obstruction. Colonic   diverticulosis without evidence of acute diverticulitis.        MACRO:   None        Signed by: Gianni Barajas 2/17/2025 8:18 PM   Dictation workstation:   [id number]      CT lumbar spine wo IV contrast   Final Result   Fracture compression deformity of T8 with approximately 50% vertebral   body height loss centrally; please correlate with point tenderness to   assess acuity.        Trace right pleural effusion and pleural thickening and right basilar   atelectasis.        6 mm oblong nodular opacity in the right lower lobe; follow-up CT   chest recommended in 6 to 12 months to assess stability.        Splenomegaly.        Hernia in the left anterior lateral pelvis wall with herniation of   segment of colon. No evidence of bowel obstruction. Colonic    diverticulosis without evidence of acute diverticulitis.        MACRO:   None        Signed by: Gianni Barajas 2/17/2025 8:18 PM   Dictation workstation:   [id number]      CT chest abdomen pelvis wo IV contrast   Final Result   Fracture compression deformity of T8 with approximately 50% vertebral   body height loss centrally; please correlate with point tenderness to   assess acuity.        Trace right pleural effusion and pleural thickening and right basilar   atelectasis.        6 mm oblong nodular opacity in the right lower lobe; follow-up CT   chest recommended in 6 to 12 months to assess stability.        Splenomegaly.        Hernia in the left anterior lateral pelvis wall with herniation of   segment of colon. No evidence of bowel obstruction. Colonic   diverticulosis without evidence of acute diverticulitis.        MACRO:   None        Signed by: Gianni Barajas 2/17/2025 8:18 PM   Dictation workstation:   [id number]      CT head W O contrast trauma protocol   Final Result   No acute intracranial hemorrhage or depressed calvarial fracture        Cerebral atrophy and chronic periventricular white matter small   vessel ischemic change.        Mucosal thickening of bilateral maxillary sinuses.        MACRO:   None        Signed by: Gianni Barajas 2/17/2025 7:56 PM   Dictation workstation:   [id number]      CT cervical spine wo IV contrast   Final Result   No evidence of acute fracture of the cervical spine.        Multilevel degenerative change of the cervical spine.        MACRO:   None        Signed by: Gianni Barajas 2/17/2025 7:58 PM   Dictation workstation:   [id number]      XR chest 1 view   Final Result   There is cardiomegaly and a left-sided cardiac pacer but the lungs   appear clear with no edema or consolidation..   Signed by Daquan Molina MD      XR pelvis 1-2 views   Final Result   There is significant arthritic change in the left hip but no definite   acute bony abnormalities are seen in this AP  view of the pelvis..   Signed by Daquan Molina MD         Labs Reviewed   CBC WITH AUTO DIFFERENTIAL - Abnormal       Result Value    WBC 3.7 (*)     nRBC 0.0      RBC 4.14      Hemoglobin 13.7      Hematocrit 41.1      MCV 99      MCH 33.1      MCHC 33.3      RDW 13.9      Platelets 69 (*)     Neutrophils % 75.6      Immature Granulocytes %, Automated 0.3      Lymphocytes % 15.7      Monocytes % 6.5      Eosinophils % 0.8      Basophils % 1.1      Neutrophils Absolute 2.79      Immature Granulocytes Absolute, Automated 0.01      Lymphocytes Absolute 0.58 (*)     Monocytes Absolute 0.24      Eosinophils Absolute 0.03      Basophils Absolute 0.04     COMPREHENSIVE METABOLIC PANEL - Abnormal    Glucose 138 (*)     Sodium 138      Potassium 3.9      Chloride 100      Bicarbonate 29      Anion Gap 13      Urea Nitrogen 14      Creatinine 0.49 (*)     eGFR >90      Calcium 9.4      Albumin 4.5      Alkaline Phosphatase 73      Total Protein 6.7      AST 16      Bilirubin, Total 1.0      ALT 9     PROTIME-INR - Abnormal    Protime 61.4 (*)     INR 5.3 (*)    LACTATE - Normal    Lactate 1.3      Narrative:     Venipuncture immediately after or during the administration of Metamizole may lead to falsely low results. Testing should be performed immediately prior to Metamizole dosing.   ALCOHOL - Normal    Alcohol <10     CREATINE KINASE - Normal    Creatine Kinase 36     TYPE AND SCREEN    ABO TYPE A      Rh TYPE POS      ANTIBODY SCREEN NEG     URINALYSIS WITH REFLEX CULTURE AND MICROSCOPIC    Narrative:     The following orders were created for panel order Urinalysis with Reflex Culture and Microscopic.  Procedure                               Abnormality         Status                     ---------                               -----------         ------                     Urinalysis with Reflex C...[[id number]]                                                 Extra Urine Gray Tube[[id number]]                                                          Please view results for these tests on the individual orders.   URINALYSIS WITH REFLEX CULTURE AND MICROSCOPIC   EXTRA URINE GRAY TUBE        Medical Decision Making  84-year-old female presents emergency department activated as an HIA.  Reports she lost her balance falling backward.  She is on Coumadin.  Patient complaining of back pain and nausea.  On my exam she is afebrile and nontoxic.  Patient is generally weak but without focal deficit.  She complains of diffuse back pain.  Given her presenting complaints a thorough workup was obtained.  C-collar was applied and cleared by myself after negative cervical spine CT for trauma.  CT head showed no acute intracranial process such as hemorrhage or mass effect.  CT chest abdomen pelvis, lumbar spines, and thoracic spine imaging was significant for a T8 compression fracture along with incidental findings of lung nodule and hernia.  Patient was advised of these findings.  Chest x-ray did not show any acute cardiopulmonary process such as pneumonia, pleural effusion, or pneumothorax.  X-ray imaging of the pelvis showed no fracture or traumatic malalignment.  Patient treated symptomatically with fentanyl, Zofran, Tylenol, and Lidoderm patch.  CBC does not show significant leukocytosis or anemia.  Patient does not have findings of sepsis.  CMP shows no significant metabolic abnormality.  CK is within normal range I do not suspect rhabdomyolysis.  UA is pending at time of disposition.  Blood alcohol is negative.  INR is supratherapeutic which patient is advised of.  I discussed with the patient her findings as well as supratherapeutic INR.  We did attempt to ambulate the patient using a walker which is what she usually ambulates with.  Patient required significant assistance from the nursing staff to ambulate and reported a a lot of back discomfort with this.  I recommended admission to the patient due to her supratherapeutic INR, difficulties  ambulating, and compression fracture of the spine.  In addition, patient lives home alone.  I am concerned that she would not be able to care for herself.  Patient declined admission and wishes to go home AGAINST MEDICAL ADVICE.  We did have extensive discussion regarding her ability to care for herself, her risk of repeat falls, and her increased risk with her INR being elevated.  I recommended holding her Coumadin for 2 days and then getting this number rechecked.  I welcomed her back to the emergency department should she change her mind at any time.  At the end of my shift patient is attempting to find a ride home.  I advised patient that if she is unable to find a ride home or changes her mind she can be admitted.  Patient was signed out to Dr. Cuevas should the patient change her mind.  Patient wishes to leave AMA.  He/she appeared rational, alert, appropriate, and exhibited no signs/symptoms of psychosis or suicidal ideation.  Patient has the capacity to make this medical decision.  Patient was aware of his/her suspected diagnosis as suggested by the initial screening exam and acknowledged their understanding for my recommendations (hospitalization, transfer, further testing, medical treatment).  Risks of refusal of recommended care were disclosed to the patient including, but not limited to death, permanent mental or physical impairment, loss of current lifestyle or paralysis.  Welcomed to return to the ED at any time regardless of their ability to pay and was provided follow up instructions.  The patient will leave AMA at this time.          Diagnoses as of 02/17/25 2230   Fall, initial encounter   Closed head injury, initial encounter   Compression fracture of T8 vertebra, initial encounter (Multi)   Lung nodule   Difficulty in walking   Supratherapeutic INR      1. Compression fracture of T8 vertebra, initial encounter (Multi)  acetaminophen (Tylenol) 500 mg tablet    lidocaine (Lidoderm) 5 % patch       2. Fall, initial encounter        3. Closed head injury, initial encounter        4. Lung nodule        5. Difficulty in walking        6. Supratherapeutic INR           Procedures     This note was dictated using dragon software and may contain errors related to dictation interpretation errors.      Osmany Small,   02/17/25 [id number]

## 2025-02-18 NOTE — CARE PLAN
The patient's goals for the shift include      The clinical goals for the shift include Patient will remain free from falls throughout shift.      Problem: Fall/Injury  Goal: Not fall by end of shift  Outcome: Progressing  Goal: Be free from injury by end of the shift  Outcome: Progressing  Goal: Verbalize understanding of personal risk factors for fall in the hospital  Outcome: Progressing  Goal: Verbalize understanding of risk factor reduction measures to prevent injury from fall in the home  Outcome: Progressing  Goal: Use assistive devices by end of the shift  Outcome: Progressing  Goal: Pace activities to prevent fatigue by end of the shift  Outcome: Progressing

## 2025-02-18 NOTE — DISCHARGE INSTRUCTIONS
Please hold your coumadin for another 2 days. Please have your INR checked and resume taking when it is back into normal range 2-3. If you have any abnormal bleeding please see your doctor or return to the hospital for further evaluation.     Return to the emergency department if symptoms worsen or change.  Take medications as prescribed.  Follow-up with your primary care doctor and orthopedic surgery.

## 2025-02-18 NOTE — H&P
Chief Complaint  Fall, back pain, difficulty ambulating    History Of Present Illness  Klaudia Sen is a 84 y.o. female with a history of alcohol use disorder, COPD, atrial fibrillation, DVT, heart failure with preserved ejection fraction, ankle fracture, depression, mild cognitive impairment, sick sinus syndrome status post pacemaker, and recurrent falls.  She presented to the emergency department on 2/17 after using her rollator at home, having difficulty with the brake, and losing her balance causing her to fall backward onto her back.  This occurred around 10 AM in the morning.  She did admit to head impact with the fall.  She denied loss of consciousness.  She noted her primary pain after the fall was diffuse back pain.  She denied any chest pain, shortness of breath, palpitations, dizziness, lightheadedness, or diaphoresis prior to her fall.  Reported being on warfarin for her heart but denied any bleeding associated with this episode.  She was unable to get up to call EMS until around 6:30 PM on the day of presentation.  Vital signs on arrival to the emergency department were unremarkable.  She reported having 3 alcohol beverages per day at home though recently has cut back to 1/day.    Laboratory evaluation in the emergency department was notable for INR 5.3, glucose 138, white blood cell count 3.7, and platelet count 69.  Creatinine, electrolytes, LFTs, lactic acid, and hemoglobin were unremarkable.  X-ray of the pelvis showed arthritis in the left hip but no acute fracture.  Chest x-ray showed cardiomegaly but lungs were otherwise clear.  CT of the head and cervical spine showed no acute abnormalities.  CT of the thoracic spine showed a T8 compression deformity with 50% vertebral body height loss.  Other incidental findings included a 6 mm nodule in the right lower lobe of the lungs and an anterior lateral pelvic wall hernia including colon.  Splenomegaly was also noted.  No acute abnormalities were seen  otherwise.  Therapeutic interventions in the emergency department included acetaminophen 975 mg p.o. x 1, lidocaine patch to back, ondansetron 4 mg IV x 1, and fentanyl 25 mcg IV x 1.  Patient was unable to ambulate and as such was admitted for further evaluation and treatment.     Past Medical History  Alcohol use disorder, COPD, atrial fibrillation, DVT, heart failure with preserved ejection fraction, ankle fracture, depression, mild cognitive impairment, sick sinus syndrome status post pacemaker, and recurrent falls.    Surgical History  Bilateral cataracts, urethral sling surgery for stress incontinence, [employer] dual-chamber pacemaker insertion 2023    Social History  She has no history on file for tobacco use, alcohol use, and drug use.    Family History  No family history on file.     Allergies  Iodine and Sulfa (sulfonamide antibiotics)    Review of Systems   Constitutional:  Negative for chills and fever.   HENT:  Negative for postnasal drip and sore throat.    Eyes:  Negative for pain and visual disturbance.   Respiratory:  Negative for cough and shortness of breath.    Cardiovascular:  Negative for chest pain, palpitations and leg swelling.   Gastrointestinal:  Negative for abdominal pain, diarrhea, nausea and vomiting.   Genitourinary:  Negative for dysuria and hematuria.   Musculoskeletal:  Positive for arthralgias and back pain.   Skin:  Negative for rash and wound.   Neurological:  Negative for dizziness and headaches.   Psychiatric/Behavioral:  Negative for dysphoric mood. The patient is not nervous/anxious.         Physical Exam  Vitals and nursing note reviewed.   Constitutional:       General: She is not in acute distress.     Appearance: She is ill-appearing.   HENT:      Head: Normocephalic and atraumatic.      Right Ear: External ear normal.      Left Ear: External ear normal.      Nose: Nose normal. No rhinorrhea.      Mouth/Throat:      Mouth: Mucous membranes are moist.      Pharynx:  Oropharynx is clear. No oropharyngeal exudate.   Eyes:      General: No scleral icterus.        Right eye: No discharge.         Left eye: No discharge.      Conjunctiva/sclera: Conjunctivae normal.   Cardiovascular:      Rate and Rhythm: Normal rate and regular rhythm.      Pulses: Normal pulses.      Heart sounds: Normal heart sounds. No murmur heard.  Pulmonary:      Effort: Pulmonary effort is normal. No respiratory distress.      Breath sounds: Normal breath sounds. No wheezing or rales.   Abdominal:      General: Abdomen is flat. There is no distension.      Palpations: Abdomen is soft.      Tenderness: There is no abdominal tenderness.   Musculoskeletal:         General: No tenderness.      Right lower leg: No edema.      Left lower leg: No edema.   Skin:     General: Skin is warm and dry.      Capillary Refill: Capillary refill takes less than 2 seconds.      Findings: No rash.   Neurological:      General: No focal deficit present.      Mental Status: She is alert and oriented to person, place, and time. Mental status is at baseline.   Psychiatric:         Mood and Affect: Mood normal.         Behavior: Behavior normal.         Thought Content: Thought content normal.         Judgment: Judgment normal.        Last Recorded Vitals  /71   Pulse 75   Temp 36 °C (96.8 °F)   Resp 18   Wt 53.5 kg (118 lb)   SpO2 96%     Relevant Results        Results for orders placed or performed during the hospital encounter of 02/17/25 (from the past 24 hours)   CBC and Auto Differential   Result Value Ref Range    WBC 3.7 (L) 4.4 - 11.3 x10*3/uL    nRBC 0.0 0.0 - 0.0 /100 WBCs    RBC 4.14 4.00 - 5.20 x10*6/uL    Hemoglobin 13.7 12.0 - 16.0 g/dL    Hematocrit 41.1 36.0 - 46.0 %    MCV 99 80 - 100 fL    MCH 33.1 26.0 - 34.0 pg    MCHC 33.3 32.0 - 36.0 g/dL    RDW 13.9 11.5 - 14.5 %    Platelets 69 (L) 150 - 450 x10*3/uL    Neutrophils % 75.6 40.0 - 80.0 %    Immature Granulocytes %, Automated 0.3 0.0 - 0.9 %     Lymphocytes % 15.7 13.0 - 44.0 %    Monocytes % 6.5 2.0 - 10.0 %    Eosinophils % 0.8 0.0 - 6.0 %    Basophils % 1.1 0.0 - 2.0 %    Neutrophils Absolute 2.79 1.60 - 5.50 x10*3/uL    Immature Granulocytes Absolute, Automated 0.01 0.00 - 0.50 x10*3/uL    Lymphocytes Absolute 0.58 (L) 0.80 - 3.00 x10*3/uL    Monocytes Absolute 0.24 0.05 - 0.80 x10*3/uL    Eosinophils Absolute 0.03 0.00 - 0.40 x10*3/uL    Basophils Absolute 0.04 0.00 - 0.10 x10*3/uL   Comprehensive Metabolic Panel   Result Value Ref Range    Glucose 138 (H) 74 - 99 mg/dL    Sodium 138 136 - 145 mmol/L    Potassium 3.9 3.5 - 5.3 mmol/L    Chloride 100 98 - 107 mmol/L    Bicarbonate 29 21 - 32 mmol/L    Anion Gap 13 10 - 20 mmol/L    Urea Nitrogen 14 6 - 23 mg/dL    Creatinine 0.49 (L) 0.50 - 1.05 mg/dL    eGFR >90 >60 mL/min/1.73m*2    Calcium 9.4 8.6 - 10.3 mg/dL    Albumin 4.5 3.4 - 5.0 g/dL    Alkaline Phosphatase 73 33 - 136 U/L    Total Protein 6.7 6.4 - 8.2 g/dL    AST 16 9 - 39 U/L    Bilirubin, Total 1.0 0.0 - 1.2 mg/dL    ALT 9 7 - 45 U/L   Lactate   Result Value Ref Range    Lactate 1.3 0.4 - 2.0 mmol/L   Protime-INR   Result Value Ref Range    Protime 61.4 (HH) 9.8 - 12.8 seconds    INR 5.3 (HH) 0.9 - 1.1   Type And Screen   Result Value Ref Range    ABO TYPE A     Rh TYPE POS     ANTIBODY SCREEN NEG    Alcohol   Result Value Ref Range    Alcohol <10 <=10 mg/dL   Creatine Kinase   Result Value Ref Range    Creatine Kinase 36 0 - 215 U/L   SST TOP   Result Value Ref Range    Extra Tube Hold for add-ons.        XR pelvis 1-2 views    Result Date: 2/17/2025  STUDY: Pelvis Radiographs; 2/17/2025 7:44 PM. INDICATION: Trauma, fall. COMPARISON: None. ACCESSION NUMBER(S): [id number] ORDERING CLINICIAN: OMERO PERDOMO TECHNIQUE:  1 view(s) of the pelvis. FINDINGS:  The pelvic ring is intact.  There is no acute fracture.  There are surgical coils in the midline lower pelvis and mild osteoarthritis in the right hip.  There is much more significant  arthritic change in the left hip with subchondral sclerosis and cyst formation in the femoral head and adjacent superior acetabulum but no definite acute fracture is visible.  Vascular calcifications are noted in both inguinal areas but no additional soft tissue abnormalities are visible.    There is significant arthritic change in the left hip but no definite acute bony abnormalities are seen in this AP view of the pelvis.. Signed by Daquan Molina MD    XR chest 1 view    Result Date: 2/17/2025  STUDY: Chest Radiograph;  2/17/2025 7:44 PM. INDICATION: Trauma. COMPARISON: None ACCESSION NUMBER(S): [id number] ORDERING CLINICIAN: OMERO PERDOMO TECHNIQUE:  Frontal chest was obtained at 19:43 hours. FINDINGS: CARDIOMEDIASTINAL SILHOUETTE: The heart is enlarged and there is a left-sided cardiac pacer but no definite vascular congestion or edema is appreciated..  LUNGS: Lungs are clear.  There is no visible consolidation, effusion or pneumothorax.  ABDOMEN: No remarkable upper abdominal findings.  BONES: No acute osseous changes.    There is cardiomegaly and a left-sided cardiac pacer but the lungs appear clear with no edema or consolidation.. Signed by Daquan Molina MD    CT thoracic spine wo IV contrast    Result Date: 2/17/2025  Interpreted By:  Gianni Barajas, STUDY: CT CHEST ABDOMEN PELVIS WO CONTRAST; CT THORACIC SPINE WO IV CONTRAST; CT LUMBAR SPINE WO IV CONTRAST;  2/17/2025 7:54 pm; 2/17/2025 8:00 pm   INDICATION: Signs/Symptoms:Trauma; Signs/Symptoms:fall, back pain.   COMPARISON: None.   ACCESSION NUMBER(S): [id number]; [id number]; [id number]   ORDERING CLINICIAN: OMERO PERDOMO   TECHNIQUE: Contiguous axial images of the chest, abdomen and pelvis were obtained without intravenous contrast. Coronal and sagittal reformatted images were obtained from the axial images. Reformatted images of the thoracic and lumbar spine were also performed.   FINDINGS: The examination is limited secondary to lack of  intravenous contrast, patient motion, and beam hardening artifact secondary to inferior position of the patient's arms.   CT CHEST:   No axillary or mediastinal lymphadenopathy. There is limited evaluation for hilar lymphadenopathy on noncontrast examination.   There is cardiomegaly. Coronary artery atherosclerotic calcifications. No significant pericardial effusion. There is mild aneurysmal dilatation of the ascending aorta measuring 4.3 cm in diameter.   Trace right pleural effusion and pleural thickening and right basilar subsegmental atelectasis. 6 mm oblong nodular opacity in the right lower lobe at the lung base. No pneumothorax.   CT ABDOMEN PELVIS:   Limited evaluation for liver mass or laceration on noncontrast examination. The gallbladder is present. No dilatation of common bile duct.   The pancreas and adrenal glands appear unremarkable.   Splenomegaly.   No evidence of renal calculus or hydronephrosis.   Atherosclerotic calcification of the abdominal aorta and iliac arteries.   There is hernia in the left anterolateral pelvic wall with herniation of segment of distal descending/proximal sigmoid colon. There is colonic diverticulosis without evidence of acute diverticulitis. No evidence of bowel obstruction.   Urinary bladder is underdistended and not well evaluated.   Advanced left hip osteoarthrosis.   CT THORACIC AND LUMBAR SPINE:   There is fracture compression deformity of T8 with process in the 50% vertebral body height loss centrally. There is osteopenia and multilevel degenerative change of the thoracic spine. There is limited evaluation of the soft tissues of the spinal canal.   No acute fracture of the lumbar spine. There is multilevel degenerative change of the lumbar spine. There is grade 2 anterolisthesis of L4 on L5. There is limited evaluation of the soft tissues of the spinal canal. There is degenerative spinal canal stenosis at L2-L3 L3-L4 and L4-L5.       Fracture compression deformity  of T8 with approximately 50% vertebral body height loss centrally; please correlate with point tenderness to assess acuity.   Trace right pleural effusion and pleural thickening and right basilar atelectasis.   6 mm oblong nodular opacity in the right lower lobe; follow-up CT chest recommended in 6 to 12 months to assess stability.   Splenomegaly.   Hernia in the left anterior lateral pelvis wall with herniation of segment of colon. No evidence of bowel obstruction. Colonic diverticulosis without evidence of acute diverticulitis.   MACRO: None   Signed by: Gianni Barajas 2/17/2025 8:18 PM Dictation workstation:   [id number]    CT lumbar spine wo IV contrast    Result Date: 2/17/2025  Interpreted By:  Gianni Barajas, STUDY: CT CHEST ABDOMEN PELVIS WO CONTRAST; CT THORACIC SPINE WO IV CONTRAST; CT LUMBAR SPINE WO IV CONTRAST;  2/17/2025 7:54 pm; 2/17/2025 8:00 pm   INDICATION: Signs/Symptoms:Trauma; Signs/Symptoms:fall, back pain.   COMPARISON: None.   ACCESSION NUMBER(S): [id number]; [id number]; [id number]   ORDERING CLINICIAN: OMERO PERDOMO   TECHNIQUE: Contiguous axial images of the chest, abdomen and pelvis were obtained without intravenous contrast. Coronal and sagittal reformatted images were obtained from the axial images. Reformatted images of the thoracic and lumbar spine were also performed.   FINDINGS: The examination is limited secondary to lack of intravenous contrast, patient motion, and beam hardening artifact secondary to inferior position of the patient's arms.   CT CHEST:   No axillary or mediastinal lymphadenopathy. There is limited evaluation for hilar lymphadenopathy on noncontrast examination.   There is cardiomegaly. Coronary artery atherosclerotic calcifications. No significant pericardial effusion. There is mild aneurysmal dilatation of the ascending aorta measuring 4.3 cm in diameter.   Trace right pleural effusion and pleural thickening and right basilar subsegmental atelectasis. 6 mm  oblong nodular opacity in the right lower lobe at the lung base. No pneumothorax.   CT ABDOMEN PELVIS:   Limited evaluation for liver mass or laceration on noncontrast examination. The gallbladder is present. No dilatation of common bile duct.   The pancreas and adrenal glands appear unremarkable.   Splenomegaly.   No evidence of renal calculus or hydronephrosis.   Atherosclerotic calcification of the abdominal aorta and iliac arteries.   There is hernia in the left anterolateral pelvic wall with herniation of segment of distal descending/proximal sigmoid colon. There is colonic diverticulosis without evidence of acute diverticulitis. No evidence of bowel obstruction.   Urinary bladder is underdistended and not well evaluated.   Advanced left hip osteoarthrosis.   CT THORACIC AND LUMBAR SPINE:   There is fracture compression deformity of T8 with process in the 50% vertebral body height loss centrally. There is osteopenia and multilevel degenerative change of the thoracic spine. There is limited evaluation of the soft tissues of the spinal canal.   No acute fracture of the lumbar spine. There is multilevel degenerative change of the lumbar spine. There is grade 2 anterolisthesis of L4 on L5. There is limited evaluation of the soft tissues of the spinal canal. There is degenerative spinal canal stenosis at L2-L3 L3-L4 and L4-L5.       Fracture compression deformity of T8 with approximately 50% vertebral body height loss centrally; please correlate with point tenderness to assess acuity.   Trace right pleural effusion and pleural thickening and right basilar atelectasis.   6 mm oblong nodular opacity in the right lower lobe; follow-up CT chest recommended in 6 to 12 months to assess stability.   Splenomegaly.   Hernia in the left anterior lateral pelvis wall with herniation of segment of colon. No evidence of bowel obstruction. Colonic diverticulosis without evidence of acute diverticulitis.   MACRO: None   Signed by:  Gianni Barajas 2/17/2025 8:18 PM Dictation workstation:   [id number]    CT chest abdomen pelvis wo IV contrast    Result Date: 2/17/2025  Interpreted By:  Gianni Barajas, STUDY: CT CHEST ABDOMEN PELVIS WO CONTRAST; CT THORACIC SPINE WO IV CONTRAST; CT LUMBAR SPINE WO IV CONTRAST;  2/17/2025 7:54 pm; 2/17/2025 8:00 pm   INDICATION: Signs/Symptoms:Trauma; Signs/Symptoms:fall, back pain.   COMPARISON: None.   ACCESSION NUMBER(S): [id number]; [id number]; [id number]   ORDERING CLINICIAN: OMERO PERDOMO   TECHNIQUE: Contiguous axial images of the chest, abdomen and pelvis were obtained without intravenous contrast. Coronal and sagittal reformatted images were obtained from the axial images. Reformatted images of the thoracic and lumbar spine were also performed.   FINDINGS: The examination is limited secondary to lack of intravenous contrast, patient motion, and beam hardening artifact secondary to inferior position of the patient's arms.   CT CHEST:   No axillary or mediastinal lymphadenopathy. There is limited evaluation for hilar lymphadenopathy on noncontrast examination.   There is cardiomegaly. Coronary artery atherosclerotic calcifications. No significant pericardial effusion. There is mild aneurysmal dilatation of the ascending aorta measuring 4.3 cm in diameter.   Trace right pleural effusion and pleural thickening and right basilar subsegmental atelectasis. 6 mm oblong nodular opacity in the right lower lobe at the lung base. No pneumothorax.   CT ABDOMEN PELVIS:   Limited evaluation for liver mass or laceration on noncontrast examination. The gallbladder is present. No dilatation of common bile duct.   The pancreas and adrenal glands appear unremarkable.   Splenomegaly.   No evidence of renal calculus or hydronephrosis.   Atherosclerotic calcification of the abdominal aorta and iliac arteries.   There is hernia in the left anterolateral pelvic wall with herniation of segment of distal descending/proximal  sigmoid colon. There is colonic diverticulosis without evidence of acute diverticulitis. No evidence of bowel obstruction.   Urinary bladder is underdistended and not well evaluated.   Advanced left hip osteoarthrosis.   CT THORACIC AND LUMBAR SPINE:   There is fracture compression deformity of T8 with process in the 50% vertebral body height loss centrally. There is osteopenia and multilevel degenerative change of the thoracic spine. There is limited evaluation of the soft tissues of the spinal canal.   No acute fracture of the lumbar spine. There is multilevel degenerative change of the lumbar spine. There is grade 2 anterolisthesis of L4 on L5. There is limited evaluation of the soft tissues of the spinal canal. There is degenerative spinal canal stenosis at L2-L3 L3-L4 and L4-L5.       Fracture compression deformity of T8 with approximately 50% vertebral body height loss centrally; please correlate with point tenderness to assess acuity.   Trace right pleural effusion and pleural thickening and right basilar atelectasis.   6 mm oblong nodular opacity in the right lower lobe; follow-up CT chest recommended in 6 to 12 months to assess stability.   Splenomegaly.   Hernia in the left anterior lateral pelvis wall with herniation of segment of colon. No evidence of bowel obstruction. Colonic diverticulosis without evidence of acute diverticulitis.   MACRO: None   Signed by: Gianni Barajas 2/17/2025 8:18 PM Dictation workstation:   [id number]    CT cervical spine wo IV contrast    Result Date: 2/17/2025  Interpreted By:  Gianni Barajas, STUDY: CT CERVICAL SPINE WO IV CONTRAST;  2/17/2025 7:51 pm   INDICATION: Signs/Symptoms:fall back pain.   COMPARISON: None.   ACCESSION NUMBER(S): [id number]   ORDERING CLINICIAN: OMERO PERDOMO   TECHNIQUE: Contiguous axial images of the cervical spine were obtained without intravenous contrast. Coronal and sagittal reformatted images were obtained from the axial images.   FINDINGS:  The examination is limited secondary to patient motion. No acute fracture of the cervical spine. There is multilevel degenerative change of the cervical spine. There is grade 1 anterolisthesis of C3 on C4, C4 on C5, and C5 on C6. There is multilevel intervertebral disc space narrowing and degenerative disc disease. There is multilevel anterior osseous spurring. There is limited evaluation of the soft tissues of the spinal canal. There is multilevel degenerative facet and uncovertebral arthropathy. No significant prevertebral soft tissue edema.       No evidence of acute fracture of the cervical spine.   Multilevel degenerative change of the cervical spine.   MACRO: None   Signed by: Gianni Barajas 2/17/2025 7:58 PM Dictation workstation:   [id number]    CT head W O contrast trauma protocol    Result Date: 2/17/2025  Interpreted By:  Gianni Barajas, STUDY: CT HEAD W/O CONTRAST TRAUMA PROTOCOL;  2/17/2025 7:51 pm   INDICATION: Trauma. Signs/Symptoms:closed head injury, fall.   COMPARISON: None.   ACCESSION NUMBER(S): [id number]   ORDERING CLINICIAN: OMERO PERDOMO   TECHNIQUE: Contiguous axial images of the head were obtained without intravenous contrast. Coronal and sagittal reformatted images were obtained from the axial images.   FINDINGS: BRAIN PARENCHYMA: There is cerebral atrophy and chronic periventricular white matter small vessel ischemic change. The gray white matter differentiation is preserved.  No mass effect or midline shift.   HEMORRHAGE: No evidence of acute intracranial hemorrhage. VENTRICLES AND EXTRA-AXIAL SPACES: The ventricles are within normal limits in size for brain volume.  No evidence of abnormal extraaxial fluid collection. PARANASAL SINUSES AND MASTOIDS: Mucosal thickening of bilateral maxillary sinuses. CALVARIUM: No evidence of depressed calvarial fracture.   EXTRACRANIAL SOFT TISSUES: Within normal limits.   OTHER FINDINGS: None     Brain Injury (BIG) guidelines CT values:   Skull  fracture: No SDH (subdural hematoma): None detected EDH (epidural hematoma): None detected IPH (intraparenchymal hemorrhage): None detected SAH (subarachnoid hemorrhage): None detected IVH (intraventricular hemorrhage): No   Reference: Daquan ARELLAON, Bala RS, Dalton M, et al. The BIG (brain injury guidelines) project: defining the management of traumatic brain injury by acute care surgeons. J Trauma Acute Care Surg. 2014;76:405a656.       No acute intracranial hemorrhage or depressed calvarial fracture   Cerebral atrophy and chronic periventricular white matter small vessel ischemic change.   Mucosal thickening of bilateral maxillary sinuses.   MACRO: None   Signed by: Gianni Barajas 2/17/2025 7:56 PM Dictation workstation:   [id number]        Assessment/Plan     Accidental Fall  T8 Compression Fracture  Difficulty Ambulating  -Consult physical and Occupational Therapy for evaluation and treatment  -Patient may benefit from more advanced imaging of compression fracture if able to obtain more information regarding pacemaker  -Pain control with Tylenol and oxycodone  - for discharge planning    Atrial Fibrillation  Supratherapeutic INR  -No evidence of bleeding complications from falls to this point  -Will hold warfarin but no urgent indication for reversal at this time  -Monitor on telemetry for RVR  -Reconcile and resume home medications.  Patient appears based on pharmacy records to be on metoprolol 50 mg twice daily and diltiazem  mg daily at home.    Alcohol Use Disorder  Mild Cognitive Impairment  -Delirium minimization measures while hospitalized  -Patient is not showing any signs of alcohol withdrawal currently but will order CIWA as a precaution, especially in light of markers of advanced hepatic disease    COPD  -Patient does not have any prescriptions for maintenance inhalers  -Will make DuoNebs available as needed while hospitalized    Thrombocytopenia  -Platelet count low on presentation  at 69.  Able to review records from [facility] where patient had similar platelet counts, typically ranging 47-51 in 2024  -Monitor for evidence of bleeding as noted  -Patient has splenomegaly on CT scan    Hyperglycemia  -Check A1c to ensure no evidence of diabetes       Glynn Melara MD

## 2025-02-18 NOTE — PROGRESS NOTES
Physical Therapy    Physical Therapy    Physical Therapy Evaluation    Patient Name: Klaudia Sen  MRN: [id number]  Today's Date: 2/18/2025   Time Calculation  Start Time: 1026  Stop Time: 1056  Time Calculation (min): 30 min  911/911-B    Assessment/Plan   PT Assessment  PT Assessment Results: Decreased strength, Decreased endurance, Impaired balance, Decreased mobility, Decreased range of motion, Pain  Rehab Prognosis: Fair  Barriers to Discharge Home:  (lives alone)  Evaluation/Treatment Tolerance: Patient limited by fatigue, Patient limited by pain  End of Session Communication: Care Coordinator  Assessment Comment: Patient presents with signfiicant limitation in left hip/knee ROM, affecting her gait mechanics. Will benefit from additonal PT to increase ROM, strength, and improve gait mechancis.  End of Session Patient Position: Bed, 2 rail up, Alarm on  IP OR SWING BED PT PLAN  Inpatient or Swing Bed: Inpatient  PT Plan  Treatment/Interventions: Bed mobility, Transfer training, Gait training, Balance training, Strengthening, Range of motion, Therapeutic exercise, Therapeutic activity, Postural re-education  PT Frequency: 3 times per week  PT Discharge Recommendations:  (Low intensity at low frequency in home setting)  Equipment Recommended upon Discharge: Wheeled walker  PT Recommended Transfer Status: Assist x1  PT - OK to Discharge:  (once deemed medically appropriate with continued PT)    Subjective         General Visit Information:  General  Reason for Referral: Impaired mobility  Referred By: PT/OT 2/18/25 Saritha  Past Medical History Relevant to Rehab: COPD, Arthrits, CHF, A Fib, Depression,ETOH, DVT, PPM  Co-Treatment: OT  Co-Treatment Reason: to maximize patien/staff safety  Patient Position Received: Bed, 2 rail up, Alarm off, not on at start of session  General Comment: To ED 2/17/25 secondary to fall due to back brake on rollator simon. INR 5.3; alcohol <10; CT 2/17/25 Head (-); Cervical spine (-);  fracture; Chest/abdomen/Thoracic/lumbar  T8 compression fracture with 50% height loss; 6 mm nodule opacity RLL; XR 2/17/25 Pelvis signifcant advanced arthritis left hip , (-) fracture    Home Living:  Home Living  Home Living Comments: Per patient report resides alone in 1 story home no steps to enter. Walk in shower and tub shower no sat has grab bar. Owns cane and rollator    Prior Level of Function:  Prior Function Per Pt/Caregiver Report  Prior Function Comments: Per patient report independent in mobilty and ADLs with rollator. Has caregiver 1x/week to assist with cleaning, laundry and transportation. Reports 1 fall. Does not drive.    Precautions:  Precautions  Hearing/Visual Limitations: [place]  Medical Precautions: Fall precautions       Objective     Pain:  Pain Assessment  0-10 (Numeric) Pain Score: 9  Pain Type: Chronic pain, Acute pain  Pain Location:  (Back/hip)  Pain Orientation: Left    Cognition:  Cognition  Overall Cognitive Status: Within Functional Limits    General Assessments:  General Observation  General Observation: Patient lying in bed. Agreeable to PT/OT   Activity Tolerance  Endurance:  (Fair)              Postural Control  Posture Comment: Forward head wth increased kyphosis, flexed hips/knees more pronounced LLE    Static Sitting Balance: Fair +  Dynamic Sitting Balance Fair  Static Standing Balance: Fair  Dynamic Standing Balance: Fair    Functional Assessments:     Bed Mobility  Bed Mobility:  (Supine > side lying using side rail to assist > sit slow transition, increasd pain. Sit > sidelying> supine + 1 minimal assist for LE  up into bed. Patient able to perform bridge to align hips better and scoot laterally. Limted by back pain.)  Transfers  Transfer:  (Sit <> stand pulls up from ww CGA, Slow to attain upright posture. Tends to adopt toe stand on LLE due to flexion contractures at knee and hip. Unable to get left heel down.Stand > sit vc to reach back SBA)  Ambulation/Gait  Training  Ambulation/Gait Training Performed:  (Patient ambulated with ww 25' with very slow radha, decreased step length, continues with toe posture LLE. CGA)          Extremity/Trunk Assessments:  RUE   RUE : Within Functional Limits  LUE   LUE: Within Functional Limits  RLE   RLE :  (ROM Hip/knee/ankle WFL MMT Hip 4/5, knee 4/5, ankle 4/5)  LLE   LLE :  (AROM hip flexion 100 degrees, extension -30 degrees, knee  degrees, ankle WFL MMT Hip 3-/5; knee 3+/5; ankle dorsiflexors 4/5)    Outcome Measures:     [facility] Basic Mobility  Turning from your back to your side while in a flat bed without using bedrails: A little  Moving from lying on your back to sitting on the side of a flat bed without using bedrails: A little  Moving to and from bed to chair (including a wheelchair): A little  Standing up from a chair using your arms (e.g. wheelchair or bedside chair): A little  To walk in hospital room: A little  Climbing 3-5 steps with railing: A little  Basic Mobility - Total Score: 18                    Goals:  Encounter Problems       Encounter Problems (Active)       PT Problem       Bed mobility supine <> side lying <> sit modified independent  (Progressing)       Start:  02/18/25    Expected End:  03/04/25            Transfers sit <> stand with ww modified independent  (Progressing)       Start:  02/18/25    Expected End:  03/04/25            Patient to ambulate with ww 30' modified independent  (Progressing)       Start:  02/18/25    Expected End:  03/04/25            AAROM left hip extension 0 degrees, knee extension -5 degrees  (Not Progressing)       Start:  02/18/25    Expected End:  03/04/25                 Education Documentation  Mobility Training, taught by Margo Franco, PT at 2/18/2025 12:33 PM.  Learner: Patient  Readiness: Acceptance  Method: Demonstration, Explanation  Response: Needs Reinforcement           ,DirectAddress_Unknown

## 2025-02-18 NOTE — PROGRESS NOTES
Occupational Therapy      Evaluation  Patient Name: Klaudia Sen  MRN: [id number]  : 1940  Today's Date: 25  Time Calculation  Start Time: 1027  Stop Time: 1055  Time Calculation (min): 28 min     911/911-B    Assessment:  OT Assessment: pt presents with decreased indep with aDLS and functional transfers, will benefit from con't skilled OT to address impairments  End of Session Communication: Care Coordinator  End of Session Patient Position: Bed, 2 rail up, Alarm on (call light in reach)  OT Assessment Results: Decreased ADL status, Decreased endurance, Decreased functional mobility    Plan:  Treatment Interventions: ADL retraining, Functional transfer training, Endurance training, Patient/family training  OT Frequency: 2 times per week  OT Discharge Recommendations: Low intensity level of continued care  Equipment Recommended upon Discharge: Wheeled walker  OT Recommended Transfer Status:  (CGA)  OT - OK to Discharge: Yes  Treatment Interventions: ADL retraining, Functional transfer training, Endurance training, Patient/family training  Subjective     Current Problem:  1. Compression fracture of T8 vertebra, initial encounter (Multi)  acetaminophen (Tylenol) 500 mg tablet    lidocaine (Lidoderm) 5 % patch      2. Fall, initial encounter        3. Closed head injury, initial encounter        4. Lung nodule        5. Difficulty in walking        6. Supratherapeutic INR                General:   OT Received On: 25  General  Reason for Referral: ADL impairment  Referred By: PT/OT 25 Saritha  Past Medical History Relevant to Rehab: COPD, Arthritis, CHF, A Fib, Depression,ETOH, DVT, PPM  Co-Treatment: PT  Co-Treatment Reason: to maximize patien/staff safety  Patient Position Received: Bed, 2 rail up, Alarm off, not on at start of session  General Comment: To ED 25 secondary to fall due to back brake on [employer]. INR 5.3; alcohol <10; CT 25 Head (-); Cervical spine (-); fracture;  Chest/abdomen/Thoracic/lumbar  T8 compression fracture with 50% height loss; 6 mm nodule opacity RLL; XR 2/17/25 Pelvis signifcant advanced arthritis left hip , (-) fracture    Precautions:  Hearing/Visual Limitations: hard of hearing  Medical Precautions: Fall precautions           Pain:  Pain Assessment  Pain Assessment: 0-10  0-10 (Numeric) Pain Score: 9  Pain Type: Chronic pain  Pain Location:  (back/hip)  Pain Orientation: Left  Objective     Cognition:  Overall Cognitive Status: Within Functional Limits             Home Living:  Home Living Comments: Per patient report resides alone in 1 story home no steps to enter. Walk in shower and tub shower no seat has grab bar. Owns cane and rollator    Prior Function:  Prior Function Comments: Per patient report independent in mobilty and ADLs with rollator. Has caregiver 1x/week to assist with cleaning, laundry and transportation. Reports 1 fall. Does not drive.           Activities of Daily Living:   Eating Assistance: Independent  Grooming Assistance: Stand by  Bathing Assistance: Minimal  UE Dressing Assistance: Minimal  LE Dressing Assistance: Maximal  Toileting Assistance with Device: Maximal  Functional Assistance:  (pt ambulated with SBA 25' with ww, significant increase in time)                         Activity Tolerance:  Endurance:  (fair)           Bed Mobility/Transfers: Bed Mobility  Bed Mobility:  (Supine > side lying using side rail to assist > sit slow transition, increasd pain. Sit > sidelying> supine + 1 minimal assist for lifting LE  up into bed. Patient able to perform bridge to align hips better and scoot laterally. Limted by back pain.)  Transfers  Transfer:  (Sit <> stand pulls up from ww CGA, Slow to attain upright posture. Tends to adopt toe stand on LLE due to flexion contractures at knee and hip. Unable to get left heel down.Stand > sit vc to reach back SBA)                Balance:  Static Sitting: good  Dynamic Sitting: fair +  Static  Standing: fair   Dynamic Standing: fair         Vision:Vision - Basic Assessment  Current Vision: No visual deficits        Sensation:  Light Touch: No apparent deficits    Strength:  Strength Comments: BUE 4/5 throughout            Extremities: RUE   RUE : Within Functional Limits and LUE   LUE: Within Functional Limits    Outcome Measures: [facility] Daily Activity  Putting on and taking off regular lower body clothing: A little  Bathing (including washing, rinsing, drying): A little  Putting on and taking off regular upper body clothing: A little  Toileting, which includes using toilet, bedpan or urinal: A little  Taking care of personal grooming such as brushing teeth: A little  Eating Meals: None  Daily Activity - Total Score: 19    Education Documentation  ADL Training, taught by Kadi Lord, OT at 2/18/2025  2:09 PM.  Learner: Patient  Readiness: Acceptance  Method: Explanation  Response: Verbalizes Understanding, Needs Reinforcement                 Goals:  Encounter Problems       Encounter Problems (Active)       OT Goals       pt will dress LB with modified indep (Progressing)       Start:  02/18/25    Expected End:  03/04/25            Pt will transfer to bed, chair, toilet with modified indep (Progressing)       Start:  02/18/25    Expected End:  03/04/25            Pt will demo fair + dyn std balance with ADLs (Progressing)       Start:  02/18/25    Expected End:  03/04/25

## 2025-02-18 NOTE — DISCHARGE SUMMARY
Discharge Diagnosis  Fall, initial encounter  T8 compression fracture  Elevated INR  Chronic atrial fibrillation  Thrombocytopenia    Issues Requiring Follow-Up  Repeat INR, CBC  Repeat CT chest for further nodule follow up    Discharge Meds     Medication List      START taking these medications     lidocaine 5 % patch; Commonly known as: Lidoderm; Place 1 patch over 12   hours on the skin once daily. Remove & discard patch within 12 hours or as   directed by MD.   oxyCODONE 5 mg immediate release tablet; Commonly known as: Roxicodone;   Take 1 tablet (5 mg) by mouth every 6 hours if needed for moderate pain (4   - 6) or severe pain (7 - 10).     CHANGE how you take these medications     * acetaminophen 500 mg tablet; Commonly known as: Tylenol; What changed:   Another medication with the same name was added. Make sure you understand   how and when to take each.   * acetaminophen 500 mg tablet; Commonly known as: Tylenol; Take 2   tablets (1,000 mg) by mouth every 8 hours if needed for mild pain (1 - 3),   moderate pain (4 - 6) or headaches for up to 10 days.; What changed: You   were already taking a medication with the same name, and this prescription   was added. Make sure you understand how and when to take each.  * This list has 2 medication(s) that are the same as other medications   prescribed for you. Read the directions carefully, and ask your doctor or   other care provider to review them with you.     CONTINUE taking these medications     albuterol 90 mcg/actuation inhaler   buPROPion  mg 24 hr tablet; Commonly known as: Wellbutrin XL   citalopram 20 mg tablet; Commonly known as: CeleXA   dilTIAZem  mg 24 hr capsule; Commonly known as: Tiazac   enalapril 5 mg tablet; Commonly known as: Vasotec   folic acid 1 mg tablet; Commonly known as: Folvite   furosemide 20 mg tablet; Commonly known as: Lasix   metoprolol tartrate 50 mg tablet; Commonly known as: Lopressor   VITAMIN B-12 ORAL   warfarin  2.5 mg tablet; Commonly known as: Coumadin       Test Results Pending At Discharge  Pending Labs       Order Current Status    Extra Urine Gray Tube In process    Urinalysis with Reflex Culture and Microscopic In process            Hospital Course   84-year-old female with past medical history of alcohol use disorder, COPD, atrial fibrillation on warfarin, chronic diastolic CHF, depression, mild cognitive impairment, sick sinus syndrome status post pacemaker, who presented to the emergency department after a fall at home.  She says the brakes on her rollator malfunctioned and her rollator moved abruptly causing her to lose balance and fall.  Trauma imaging did reveal a T8 compression fracture.  She was having back pain with point tenderness at the fracture suggesting that this was acute or subacute.  She was managed symptomatically.  She worked with PT and OT and was able ambulate without difficulty.  She declined home health care and healthy at home programs for ongoing PT and OT.  Her INR was elevated on admission part 5.7 and her warfarin has been held.  She has had no evidence of ongoing bleeding here.  She does have thrombocytopenia which is stable compared to prior lab values.  I recommended that she continue to hold her warfarin and get regular INR checks and resume once in range.  Also recommend a repeat CBC in 1 week.  She should have follow-up CT chest for further evaluation of her lung nodules in 6 to 12 months.  Otherwise she remains hemodynamically stable and otherwise improved for discharge home today.  Greater than 30-minute spent discharge activities.    Pertinent Physical Exam At Time of Discharge  Physical Exam  GEN: healthy appearing, appears stated age, NAD  HEENT: NCAT, PERRLA, EOMI, moist mucous membranes  NECK: supple, no masses  CV: RRR, no m/r/g, no LE edema  LUNGS: CTAB, no w/r/c  ABD: soft, NT, ND, NBS  SKIN: no rashes  MSK; no gross deformities, normal joints  NEURO: A+Ox3, no FND  PSYCH:  appropriate mood, affect      Outpatient Follow-Up  No future appointments.      Valerio Granados MD

## 2025-02-25 DIAGNOSIS — S22.060A COMPRESSION FRACTURE OF T8 VERTEBRA, INITIAL ENCOUNTER (MULTI): ICD-10-CM

## 2025-03-11 ENCOUNTER — OFFICE VISIT (OUTPATIENT)
Dept: ORTHOPEDIC SURGERY | Facility: CLINIC | Age: 85
End: 2025-03-11
Payer: MEDICARE

## 2025-03-11 ENCOUNTER — HOSPITAL ENCOUNTER (OUTPATIENT)
Dept: RADIOLOGY | Facility: CLINIC | Age: 85
Discharge: HOME | End: 2025-03-11
Payer: MEDICARE

## 2025-03-11 DIAGNOSIS — M54.50 LUMBAR PAIN: ICD-10-CM

## 2025-03-11 DIAGNOSIS — M54.9 MID BACK PAIN: ICD-10-CM

## 2025-03-11 DIAGNOSIS — M54.9 MID BACK PAIN: Primary | ICD-10-CM

## 2025-03-11 PROCEDURE — 99215 OFFICE O/P EST HI 40 MIN: CPT | Performed by: ORTHOPAEDIC SURGERY

## 2025-03-11 PROCEDURE — 72070 X-RAY EXAM THORAC SPINE 2VWS: CPT

## 2025-03-11 PROCEDURE — 99204 OFFICE O/P NEW MOD 45 MIN: CPT | Performed by: ORTHOPAEDIC SURGERY

## 2025-03-11 PROCEDURE — 72100 X-RAY EXAM L-S SPINE 2/3 VWS: CPT

## 2025-03-11 NOTE — PROGRESS NOTES
Klaudia Sen is a 84 y.o. female who presents for new patient evaluation of low back pain.    HPI:  84-year-old female here for new patient evaluation of low back pain after a fall.  She denies any fever chills nausea vomiting night sweats.  She has no bowel or bladder complaints.    Physical exam:  Well-nourished, well kept.No lymphangitis or lymphadenopathy in the examined extremities.  Patient is in office supplied wheelchair today, has a very difficult time rising from a seated position or sitting from a standing position, can stand on heels and toes with a lot of difficulty.   Examination of the back shows tenderness in the paraspinous musculature mostly in the mid thoracic area.  There is decreased range of motion in all directions due to guarding/muscle spasms and pain at extremes.  There is good strength and no instability.  Examination of the lower extremities reveals no point tenderness, swelling, or deformity.  Range of motion of the hips, knees, and ankles are full without crepitance, instability, or exacerbation of pain.  Strength is 5/5 throughout.  No redness, abrasions, or lesions on extremities  Gross sensation intact in the extremities.  Affect normal.  Alert and oriented ×3.  Coordination normal.    Imaging studies:  We have ordered and reviewed AP lateral x-rays of the lumbar spine.  We have ordered and reviewed AP lateral x-rays of the thoracic spine.  We reviewed a CT of the lumbar spine from February 17, 2025 we reviewed a CT of the thoracic spine from February 17, 2025.    Assessment:  84-year-old female here for new patient evaluation of low back pain after a fall.  She fell mid February when her walker got away from her, prior to her fall she was not having any back pain.  After her back pain she was having some midthoracic slightly left of center back pain.  She was admitted to the hospital and it was discovered she had a T8 compression fracture.  We did review that CT, we took x-rays of  the thoracic spine today and reviewed those, it looks like the compression deformity has worsened over the past month..  Recently she was not having any back pain, but she said today her back pain has started to come back.  She is not describing any leg pain.  She does have a a pacemaker and she does not know if it is MRI compatible or not.    We have ordered and reviewed tests, x-rays x 2, CT x 2.  I did review the emergency department notes from February 17, 2025 that discusses her back pain and her fall.  She is here with her son today who is a helpful and necessary historian.  This is an undiagnosed new problem with uncertain prognosis.  It is affecting her bodily function.    For complete plan and/or surgical details, please refer to Dr. Marrero's portion of this split dictation.    -Kendall Stewart PA-C    In a face-to-face encounter, I performed a history and physical examination, discussed pertinent diagnostic studies if indicated, and discussed diagnosis and management strategies with both the patient and the midlevel provider.  I reviewed the midlevel's note and agree with the documented findings and plan of care.    Patient fell and sustained a T8 compression fracture visualized on CT scan on 2/17/2025.  She is now here because her pain increased.  She is severely and hit with bodily function because she has had some increase in pain but she also needs a wheelchair to get into our office because she has ambulation and balance issues which are chronic.  She does have increased compression of x-rays that were ordered and reviewed today of the thoracic spine.  They show almost 75 to 80% volume loss compared to about 50 to 60% volume loss in February.  We also ordered and reviewed x-rays of the lumbar spine today which does not show any significant abnormalities except spondylolisthesis but no fractures.  I had a long discussion with the patient and her son about kyphoplasty.  We talked about surgical  intervention and we talked about the pros and cons of kyphoplasty at this point they want to proceed with nonsurgical treatment which I think is very reasonable way to go.  We did discuss to consider brace use but the patient said she would not use 1.  We are going to let her engage in light activities as tolerated and she can follow-up with me in apparent basis if she has any questions or concerns.    Abdullahi Marrero MD  Orthopedic surgery